# Patient Record
Sex: MALE | Race: WHITE | NOT HISPANIC OR LATINO | Employment: FULL TIME | URBAN - METROPOLITAN AREA
[De-identification: names, ages, dates, MRNs, and addresses within clinical notes are randomized per-mention and may not be internally consistent; named-entity substitution may affect disease eponyms.]

---

## 2022-10-15 ENCOUNTER — HOSPITAL ENCOUNTER (OUTPATIENT)
Facility: HOSPITAL | Age: 57
Discharge: HOME | End: 2022-10-17
Attending: STUDENT IN AN ORGANIZED HEALTH CARE EDUCATION/TRAINING PROGRAM | Admitting: INTERNAL MEDICINE
Payer: COMMERCIAL

## 2022-10-15 DIAGNOSIS — R07.89 OTHER CHEST PAIN: ICD-10-CM

## 2022-10-15 DIAGNOSIS — R07.9 CHEST PAIN: ICD-10-CM

## 2022-10-15 DIAGNOSIS — I25.10 CAD (CORONARY ARTERY DISEASE): ICD-10-CM

## 2022-10-15 DIAGNOSIS — I21.4 NSTEMI (NON-ST ELEVATED MYOCARDIAL INFARCTION): ICD-10-CM

## 2022-10-15 LAB
ALBUMIN SERPL BCP-MCNC: 4.1 G/DL (ref 3.5–5.2)
ALP SERPL-CCNC: 73 U/L (ref 55–135)
ALT SERPL W/O P-5'-P-CCNC: 42 U/L (ref 10–44)
ANION GAP SERPL CALC-SCNC: 14 MMOL/L (ref 8–16)
AST SERPL-CCNC: 42 U/L (ref 10–40)
BASOPHILS # BLD AUTO: 0.05 K/UL (ref 0–0.2)
BASOPHILS NFR BLD: 0.4 % (ref 0–1.9)
BILIRUB SERPL-MCNC: 1 MG/DL (ref 0.1–1)
BNP SERPL-MCNC: 20 PG/ML (ref 0–99)
BUN SERPL-MCNC: 18 MG/DL (ref 6–20)
CALCIUM SERPL-MCNC: 9.3 MG/DL (ref 8.7–10.5)
CHLORIDE SERPL-SCNC: 104 MMOL/L (ref 95–110)
CO2 SERPL-SCNC: 20 MMOL/L (ref 23–29)
CREAT SERPL-MCNC: 1.3 MG/DL (ref 0.5–1.4)
DIFFERENTIAL METHOD: ABNORMAL
EOSINOPHIL # BLD AUTO: 0.2 K/UL (ref 0–0.5)
EOSINOPHIL NFR BLD: 1.5 % (ref 0–8)
ERYTHROCYTE [DISTWIDTH] IN BLOOD BY AUTOMATED COUNT: 12.9 % (ref 11.5–14.5)
EST. GFR  (NO RACE VARIABLE): >60 ML/MIN/1.73 M^2
GLUCOSE SERPL-MCNC: 145 MG/DL (ref 70–110)
HCT VFR BLD AUTO: 49 % (ref 40–54)
HGB BLD-MCNC: 16.8 G/DL (ref 14–18)
IMM GRANULOCYTES # BLD AUTO: 0.05 K/UL (ref 0–0.04)
IMM GRANULOCYTES NFR BLD AUTO: 0.4 % (ref 0–0.5)
INR PPP: 1.1
LYMPHOCYTES # BLD AUTO: 1.9 K/UL (ref 1–4.8)
LYMPHOCYTES NFR BLD: 14.8 % (ref 18–48)
MCH RBC QN AUTO: 30.3 PG (ref 27–31)
MCHC RBC AUTO-ENTMCNC: 34.3 G/DL (ref 32–36)
MCV RBC AUTO: 88 FL (ref 82–98)
MONOCYTES # BLD AUTO: 0.7 K/UL (ref 0.3–1)
MONOCYTES NFR BLD: 5.3 % (ref 4–15)
NEUTROPHILS # BLD AUTO: 10.2 K/UL (ref 1.8–7.7)
NEUTROPHILS NFR BLD: 77.6 % (ref 38–73)
NRBC BLD-RTO: 0 /100 WBC
PLATELET # BLD AUTO: 248 K/UL (ref 150–450)
PMV BLD AUTO: 10.5 FL (ref 9.2–12.9)
POTASSIUM SERPL-SCNC: 4 MMOL/L (ref 3.5–5.1)
PROT SERPL-MCNC: 8.7 G/DL (ref 6–8.4)
PROTHROMBIN TIME: 13.3 SEC (ref 11.4–13.7)
RBC # BLD AUTO: 5.54 M/UL (ref 4.6–6.2)
SODIUM SERPL-SCNC: 138 MMOL/L (ref 136–145)
TROPONIN I SERPL DL<=0.01 NG/ML-MCNC: 0.08 NG/ML
TROPONIN I SERPL DL<=0.01 NG/ML-MCNC: <0.03 NG/ML
WBC # BLD AUTO: 13.07 K/UL (ref 3.9–12.7)

## 2022-10-15 PROCEDURE — 80053 COMPREHEN METABOLIC PANEL: CPT | Performed by: STUDENT IN AN ORGANIZED HEALTH CARE EDUCATION/TRAINING PROGRAM

## 2022-10-15 PROCEDURE — 25000003 PHARM REV CODE 250: Performed by: STUDENT IN AN ORGANIZED HEALTH CARE EDUCATION/TRAINING PROGRAM

## 2022-10-15 PROCEDURE — 93005 ELECTROCARDIOGRAM TRACING: CPT | Performed by: INTERNAL MEDICINE

## 2022-10-15 PROCEDURE — 85610 PROTHROMBIN TIME: CPT | Performed by: STUDENT IN AN ORGANIZED HEALTH CARE EDUCATION/TRAINING PROGRAM

## 2022-10-15 PROCEDURE — 84484 ASSAY OF TROPONIN QUANT: CPT | Mod: 91 | Performed by: STUDENT IN AN ORGANIZED HEALTH CARE EDUCATION/TRAINING PROGRAM

## 2022-10-15 PROCEDURE — 93010 ELECTROCARDIOGRAM REPORT: CPT | Mod: ,,, | Performed by: INTERNAL MEDICINE

## 2022-10-15 PROCEDURE — 99285 EMERGENCY DEPT VISIT HI MDM: CPT | Mod: 25

## 2022-10-15 PROCEDURE — 93010 ELECTROCARDIOGRAM REPORT: CPT | Mod: 76,,, | Performed by: INTERNAL MEDICINE

## 2022-10-15 PROCEDURE — 93010 EKG 12-LEAD: ICD-10-PCS | Mod: ,,, | Performed by: INTERNAL MEDICINE

## 2022-10-15 PROCEDURE — 85025 COMPLETE CBC W/AUTO DIFF WBC: CPT | Performed by: STUDENT IN AN ORGANIZED HEALTH CARE EDUCATION/TRAINING PROGRAM

## 2022-10-15 PROCEDURE — 84484 ASSAY OF TROPONIN QUANT: CPT | Performed by: STUDENT IN AN ORGANIZED HEALTH CARE EDUCATION/TRAINING PROGRAM

## 2022-10-15 PROCEDURE — 83880 ASSAY OF NATRIURETIC PEPTIDE: CPT | Performed by: STUDENT IN AN ORGANIZED HEALTH CARE EDUCATION/TRAINING PROGRAM

## 2022-10-15 RX ORDER — SECUKINUMAB 150 MG/ML
300 INJECTION SUBCUTANEOUS
COMMUNITY
Start: 2022-09-21

## 2022-10-15 RX ORDER — NAPROXEN SODIUM 220 MG/1
324 TABLET, FILM COATED ORAL ONCE
Status: COMPLETED | OUTPATIENT
Start: 2022-10-15 | End: 2022-10-15

## 2022-10-15 RX ADMIN — ASPIRIN 81 MG CHEWABLE TABLET 324 MG: 81 TABLET CHEWABLE at 07:10

## 2022-10-15 NOTE — Clinical Note
The catheter was inserted into the left ventricle. An angiography was performed of the LV. The angiography was performed via power injection. The injected amount was 24 mL contrast at 12 mL/s. The PSI from the power injection was 500.

## 2022-10-15 NOTE — Clinical Note
110 ml of contrast were injected throughout the case. 90 mL of contrast was the total wasted during the case. 200 mL was the total amount used during the case.

## 2022-10-15 NOTE — Clinical Note
A dressing was applied to the incision. Gauze and tegaderm to be applied post closure device and manual pressure

## 2022-10-15 NOTE — Clinical Note
The catheter was removed from the aorta. Vaccine Information Statement(s) was given today. This has been reviewed, questions answered, and verbal consent given by Patient for injection(s) and administration of Hepatitis A.        Patient tolerated without incident. See immunization grid for documentation.

## 2022-10-16 ENCOUNTER — CLINICAL SUPPORT (OUTPATIENT)
Dept: CARDIOLOGY | Facility: HOSPITAL | Age: 57
End: 2022-10-16
Payer: COMMERCIAL

## 2022-10-16 VITALS — HEIGHT: 64 IN | WEIGHT: 215 LBS | BODY MASS INDEX: 36.7 KG/M2

## 2022-10-16 PROBLEM — R07.9 CHEST PAIN: Status: ACTIVE | Noted: 2022-10-16

## 2022-10-16 PROBLEM — E66.01 SEVERE OBESITY (BMI >= 40): Status: ACTIVE | Noted: 2022-10-16

## 2022-10-16 LAB
ALBUMIN SERPL BCP-MCNC: 3.6 G/DL (ref 3.5–5.2)
ALP SERPL-CCNC: 63 U/L (ref 55–135)
ALT SERPL W/O P-5'-P-CCNC: 36 U/L (ref 10–44)
ANION GAP SERPL CALC-SCNC: 9 MMOL/L (ref 8–16)
AORTIC ROOT ANNULUS: 3.03 CM
APTT PPP: 32.1 SEC (ref 23.3–35.1)
APTT PPP: 55.5 SEC (ref 23.3–35.1)
AST SERPL-CCNC: 35 U/L (ref 10–40)
AV INDEX (PROSTH): 1.01
AV MEAN GRADIENT: 4 MMHG
AV VALVE AREA: 3.16 CM2
BASOPHILS # BLD AUTO: 0.02 K/UL (ref 0–0.2)
BASOPHILS NFR BLD: 0.3 % (ref 0–1.9)
BILIRUB SERPL-MCNC: 1.2 MG/DL (ref 0.1–1)
BSA FOR ECHO PROCEDURE: 2.1 M2
BUN SERPL-MCNC: 17 MG/DL (ref 6–20)
CALCIUM SERPL-MCNC: 8.5 MG/DL (ref 8.7–10.5)
CHLORIDE SERPL-SCNC: 103 MMOL/L (ref 95–110)
CO2 SERPL-SCNC: 25 MMOL/L (ref 23–29)
CREAT SERPL-MCNC: 1.1 MG/DL (ref 0.5–1.4)
CV ECHO LV RWT: 0.81 CM
DIFFERENTIAL METHOD: NORMAL
DOP CALC AO VTI: 23.65 CM
DOP CALC LVOT AREA: 3.1 CM2
DOP CALC LVOT DIAMETER: 2 CM
DOP CALC LVOT PEAK VEL: 103.64 M/S
DOP CALC LVOT STROKE VOLUME: 74.7 CM3
DOP CALCLVOT PEAK VEL VTI: 23.79 CM
E WAVE DECELERATION TIME: 229.36 MSEC
E/A RATIO: 1.36
E/E' RATIO: 7.22 M/S
ECHO LV POSTERIOR WALL: 1.42 CM (ref 0.6–1.1)
EJECTION FRACTION: 60 %
EOSINOPHIL # BLD AUTO: 0.2 K/UL (ref 0–0.5)
EOSINOPHIL NFR BLD: 3.6 % (ref 0–8)
ERYTHROCYTE [DISTWIDTH] IN BLOOD BY AUTOMATED COUNT: 12.9 % (ref 11.5–14.5)
EST. GFR  (NO RACE VARIABLE): >60 ML/MIN/1.73 M^2
FRACTIONAL SHORTENING: 31 % (ref 28–44)
GLUCOSE SERPL-MCNC: 130 MG/DL (ref 70–110)
HCT VFR BLD AUTO: 44.4 % (ref 40–54)
HGB BLD-MCNC: 15.4 G/DL (ref 14–18)
IMM GRANULOCYTES # BLD AUTO: 0.03 K/UL (ref 0–0.04)
IMM GRANULOCYTES NFR BLD AUTO: 0.5 % (ref 0–0.5)
INR PPP: 1.1
INTERVENTRICULAR SEPTUM: 1.45 CM (ref 0.6–1.1)
IVRT: 66.3 MSEC
LEFT ATRIUM SIZE: 3.52 CM
LEFT INTERNAL DIMENSION IN SYSTOLE: 2.41 CM (ref 2.1–4)
LEFT VENTRICLE DIASTOLIC VOLUME INDEX: 20.97 ML/M2
LEFT VENTRICLE DIASTOLIC VOLUME: 42.35 ML
LEFT VENTRICLE MASS INDEX: 89 G/M2
LEFT VENTRICLE SYSTOLIC VOLUME INDEX: 6.9 ML/M2
LEFT VENTRICLE SYSTOLIC VOLUME: 13.97 ML
LEFT VENTRICULAR INTERNAL DIMENSION IN DIASTOLE: 3.49 CM (ref 3.5–6)
LEFT VENTRICULAR MASS: 179.27 G
LV LATERAL E/E' RATIO: 5.93 M/S
LV SEPTAL E/E' RATIO: 9.22 M/S
LYMPHOCYTES # BLD AUTO: 1.9 K/UL (ref 1–4.8)
LYMPHOCYTES NFR BLD: 27.9 % (ref 18–48)
MCH RBC QN AUTO: 31 PG (ref 27–31)
MCHC RBC AUTO-ENTMCNC: 34.7 G/DL (ref 32–36)
MCV RBC AUTO: 90 FL (ref 82–98)
MONOCYTES # BLD AUTO: 0.5 K/UL (ref 0.3–1)
MONOCYTES NFR BLD: 8.1 % (ref 4–15)
MV PEAK A VEL: 0.61 M/S
MV PEAK E VEL: 0.83 M/S
NEUTROPHILS # BLD AUTO: 4 K/UL (ref 1.8–7.7)
NEUTROPHILS NFR BLD: 59.6 % (ref 38–73)
NRBC BLD-RTO: 0 /100 WBC
PLATELET # BLD AUTO: 201 K/UL (ref 150–450)
PMV BLD AUTO: 10.7 FL (ref 9.2–12.9)
POTASSIUM SERPL-SCNC: 3.8 MMOL/L (ref 3.5–5.1)
PROT SERPL-MCNC: 7.6 G/DL (ref 6–8.4)
PROTHROMBIN TIME: 13.6 SEC (ref 11.4–13.7)
RA PRESSURE: 3 MMHG
RBC # BLD AUTO: 4.96 M/UL (ref 4.6–6.2)
RIGHT VENTRICULAR END-DIASTOLIC DIMENSION: 3.08 CM
SARS-COV-2 RDRP RESP QL NAA+PROBE: NEGATIVE
SODIUM SERPL-SCNC: 137 MMOL/L (ref 136–145)
TDI LATERAL: 0.14 M/S
TDI SEPTAL: 0.09 M/S
TDI: 0.12 M/S
TRICUSPID ANNULAR PLANE SYSTOLIC EXCURSION: 2.01 CM
TROPONIN I SERPL DL<=0.01 NG/ML-MCNC: 0.24 NG/ML
TROPONIN I SERPL DL<=0.01 NG/ML-MCNC: 0.7 NG/ML
TROPONIN I SERPL DL<=0.01 NG/ML-MCNC: 0.87 NG/ML
WBC # BLD AUTO: 6.63 K/UL (ref 3.9–12.7)

## 2022-10-16 PROCEDURE — 63600175 PHARM REV CODE 636 W HCPCS: Performed by: STUDENT IN AN ORGANIZED HEALTH CARE EDUCATION/TRAINING PROGRAM

## 2022-10-16 PROCEDURE — 96376 TX/PRO/DX INJ SAME DRUG ADON: CPT | Mod: 59

## 2022-10-16 PROCEDURE — 25000003 PHARM REV CODE 250: Performed by: NURSE PRACTITIONER

## 2022-10-16 PROCEDURE — 99220 PR INITIAL OBSERVATION CARE,LEVL III: ICD-10-PCS | Mod: 25,,, | Performed by: INTERNAL MEDICINE

## 2022-10-16 PROCEDURE — 99220 PR INITIAL OBSERVATION CARE,LEVL III: CPT | Mod: 25,,, | Performed by: INTERNAL MEDICINE

## 2022-10-16 PROCEDURE — G0378 HOSPITAL OBSERVATION PER HR: HCPCS

## 2022-10-16 PROCEDURE — 93306 TTE W/DOPPLER COMPLETE: CPT

## 2022-10-16 PROCEDURE — U0002 COVID-19 LAB TEST NON-CDC: HCPCS | Performed by: NURSE PRACTITIONER

## 2022-10-16 PROCEDURE — 83036 HEMOGLOBIN GLYCOSYLATED A1C: CPT | Performed by: NURSE PRACTITIONER

## 2022-10-16 PROCEDURE — 25000003 PHARM REV CODE 250: Performed by: INTERNAL MEDICINE

## 2022-10-16 PROCEDURE — 84484 ASSAY OF TROPONIN QUANT: CPT | Mod: 91 | Performed by: STUDENT IN AN ORGANIZED HEALTH CARE EDUCATION/TRAINING PROGRAM

## 2022-10-16 PROCEDURE — 93306 ECHO (CUPID ONLY): ICD-10-PCS | Mod: 26,,, | Performed by: INTERNAL MEDICINE

## 2022-10-16 PROCEDURE — 85730 THROMBOPLASTIN TIME PARTIAL: CPT | Performed by: STUDENT IN AN ORGANIZED HEALTH CARE EDUCATION/TRAINING PROGRAM

## 2022-10-16 PROCEDURE — 93005 ELECTROCARDIOGRAM TRACING: CPT | Performed by: INTERNAL MEDICINE

## 2022-10-16 PROCEDURE — 85610 PROTHROMBIN TIME: CPT | Performed by: STUDENT IN AN ORGANIZED HEALTH CARE EDUCATION/TRAINING PROGRAM

## 2022-10-16 PROCEDURE — 93010 EKG 12-LEAD: ICD-10-PCS | Mod: ,,, | Performed by: INTERNAL MEDICINE

## 2022-10-16 PROCEDURE — 84484 ASSAY OF TROPONIN QUANT: CPT | Performed by: NURSE PRACTITIONER

## 2022-10-16 PROCEDURE — 36415 COLL VENOUS BLD VENIPUNCTURE: CPT | Performed by: NURSE PRACTITIONER

## 2022-10-16 PROCEDURE — 85025 COMPLETE CBC W/AUTO DIFF WBC: CPT | Performed by: INTERNAL MEDICINE

## 2022-10-16 PROCEDURE — 93010 ELECTROCARDIOGRAM REPORT: CPT | Mod: ,,, | Performed by: INTERNAL MEDICINE

## 2022-10-16 PROCEDURE — 80053 COMPREHEN METABOLIC PANEL: CPT | Performed by: INTERNAL MEDICINE

## 2022-10-16 PROCEDURE — 96374 THER/PROPH/DIAG INJ IV PUSH: CPT | Mod: 59

## 2022-10-16 PROCEDURE — 93306 TTE W/DOPPLER COMPLETE: CPT | Mod: 26,,, | Performed by: INTERNAL MEDICINE

## 2022-10-16 PROCEDURE — 36415 COLL VENOUS BLD VENIPUNCTURE: CPT | Performed by: STUDENT IN AN ORGANIZED HEALTH CARE EDUCATION/TRAINING PROGRAM

## 2022-10-16 RX ORDER — SODIUM CHLORIDE 0.9 % (FLUSH) 0.9 %
10 SYRINGE (ML) INJECTION
Status: DISCONTINUED | OUTPATIENT
Start: 2022-10-16 | End: 2022-10-17 | Stop reason: HOSPADM

## 2022-10-16 RX ORDER — HEPARIN SODIUM,PORCINE/D5W 25000/250
0-40 INTRAVENOUS SOLUTION INTRAVENOUS CONTINUOUS
Status: DISCONTINUED | OUTPATIENT
Start: 2022-10-16 | End: 2022-10-17 | Stop reason: HOSPADM

## 2022-10-16 RX ORDER — ASPIRIN 325 MG
325 TABLET, DELAYED RELEASE (ENTERIC COATED) ORAL DAILY
Status: DISCONTINUED | OUTPATIENT
Start: 2022-10-16 | End: 2022-10-16

## 2022-10-16 RX ORDER — METOPROLOL TARTRATE 25 MG/1
25 TABLET, FILM COATED ORAL 2 TIMES DAILY
Status: DISCONTINUED | OUTPATIENT
Start: 2022-10-16 | End: 2022-10-17 | Stop reason: HOSPADM

## 2022-10-16 RX ORDER — NITROGLYCERIN 0.4 MG/1
0.4 TABLET SUBLINGUAL EVERY 5 MIN PRN
Status: DISCONTINUED | OUTPATIENT
Start: 2022-10-16 | End: 2022-10-17 | Stop reason: HOSPADM

## 2022-10-16 RX ORDER — IBUPROFEN 400 MG/1
400 TABLET ORAL EVERY 6 HOURS PRN
Status: DISCONTINUED | OUTPATIENT
Start: 2022-10-16 | End: 2022-10-17 | Stop reason: HOSPADM

## 2022-10-16 RX ORDER — ASPIRIN 325 MG
325 TABLET, DELAYED RELEASE (ENTERIC COATED) ORAL DAILY
Status: DISCONTINUED | OUTPATIENT
Start: 2022-10-17 | End: 2022-10-17

## 2022-10-16 RX ORDER — FAMOTIDINE 20 MG/1
20 TABLET, FILM COATED ORAL 2 TIMES DAILY
Status: DISCONTINUED | OUTPATIENT
Start: 2022-10-16 | End: 2022-10-17 | Stop reason: HOSPADM

## 2022-10-16 RX ORDER — ONDANSETRON 2 MG/ML
4 INJECTION INTRAMUSCULAR; INTRAVENOUS EVERY 8 HOURS PRN
Status: DISCONTINUED | OUTPATIENT
Start: 2022-10-16 | End: 2022-10-17 | Stop reason: HOSPADM

## 2022-10-16 RX ORDER — SODIUM CHLORIDE 9 MG/ML
INJECTION, SOLUTION INTRAVENOUS ONCE
Status: COMPLETED | OUTPATIENT
Start: 2022-10-17 | End: 2022-10-16

## 2022-10-16 RX ORDER — TALC
6 POWDER (GRAM) TOPICAL NIGHTLY PRN
Status: DISCONTINUED | OUTPATIENT
Start: 2022-10-16 | End: 2022-10-17 | Stop reason: HOSPADM

## 2022-10-16 RX ORDER — ATORVASTATIN CALCIUM 40 MG/1
40 TABLET, FILM COATED ORAL DAILY
Status: DISCONTINUED | OUTPATIENT
Start: 2022-10-16 | End: 2022-10-17 | Stop reason: HOSPADM

## 2022-10-16 RX ADMIN — FAMOTIDINE 20 MG: 20 TABLET ORAL at 08:10

## 2022-10-16 RX ADMIN — METOPROLOL TARTRATE 25 MG: 25 TABLET, FILM COATED ORAL at 08:10

## 2022-10-16 RX ADMIN — HEPARIN SODIUM AND DEXTROSE 12 UNITS/KG/HR: 10000; 5 INJECTION INTRAVENOUS at 11:10

## 2022-10-16 RX ADMIN — HEPARIN SODIUM AND DEXTROSE 15 UNITS/KG/HR: 10000; 5 INJECTION INTRAVENOUS at 11:10

## 2022-10-16 RX ADMIN — METOPROLOL TARTRATE 25 MG: 25 TABLET, FILM COATED ORAL at 11:10

## 2022-10-16 RX ADMIN — SODIUM CHLORIDE: 0.9 INJECTION, SOLUTION INTRAVENOUS at 11:10

## 2022-10-16 RX ADMIN — FAMOTIDINE 20 MG: 20 TABLET ORAL at 11:10

## 2022-10-16 RX ADMIN — ATORVASTATIN CALCIUM 40 MG: 40 TABLET, FILM COATED ORAL at 11:10

## 2022-10-16 NOTE — NURSING
Troponin elevated, no stress test at this time per Dr Sotelo, continue to trend troponins. Echocardiogram in progress. RUPERTO Glover notified.

## 2022-10-16 NOTE — ED NOTES
""My heart felt like it was beating out of my chest, felt like difficulty breathing, and my fingers tingled" -- patient's description of the event that led him to ER.     HTN upon triage 182/98, HR 98, no dyspnea, no diaphoresis, no c/o N/V, denies tingling in extremities     "

## 2022-10-16 NOTE — ED PROVIDER NOTES
Encounter Date: 10/15/2022       History     Chief Complaint   Patient presents with    Chest Pain     Pt presents to er complaining of mid sternal chest pain causing tingling in bilateral arms and hands. Pt states the pain started while driving and he attempted to drink some water and states that it feels like he has a lump in his chest.      HPI  57-year-old male with past medical history of obesity and psoriasis presenting to the emergency department for acute onset chest pain.  Patient reports the pain began 30 minutes after physical exertion.  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  30 minutes after completion, he noted substernal chest tightness with associated shortness of breath.  After resting and drinking some water, the pain began to subside and ultimately fully resolved approximately 30 minutes prior to arrival at the emergency department.  Denies any associated nausea, vomiting, diaphoresis.  He denies prior episodes of chest pain, cardiac history, relevant family history of early cardiac death, smoking history.    Review of patient's allergies indicates:   Allergen Reactions    Codeine Hives    Penicillins Hives     Past Medical History:   Diagnosis Date    Acid reflux     Obesity     Psoriasis     severe    Psoriatic arthritis     Urolithiasis      Past Surgical History:   Procedure Laterality Date    CIRCUMCISION      EXTRACORPOREAL SHOCK WAVE LITHOTRIPSY  2006    tristan redmond md    holmium lithotripsy L ureteral stone  8/2014    [facility]    ureteroscopic removal of stones  2006    tristan redmond md     No family history on file.  Social History     Tobacco Use    Smoking status: Never    Smokeless tobacco: Never   Substance Use Topics    Alcohol use: No    Drug use: No     Review of Systems   Constitutional:  Negative for fever.   HENT:  Negative for congestion.    Respiratory:  Positive for shortness of breath.     Cardiovascular:  Positive for chest pain.   Gastrointestinal:  Negative for abdominal pain.   Genitourinary:  Negative for dysuria.   Musculoskeletal:  Negative for arthralgias.   Neurological:  Negative for dizziness and syncope.   Psychiatric/Behavioral:  Negative for agitation.      Physical Exam     Initial Vitals [10/15/22 1921]   BP Pulse Resp Temp SpO2   (!) 182/98 102 18 98.5 °F (36.9 °C) 97 %      MAP       --         Physical Exam    Nursing note and vitals reviewed.  Constitutional:   57-year-old male sitting comfortably in bed, awake, alert and responding to questions appropriately.   HENT:   Head: Normocephalic and atraumatic.   Mouth/Throat: Oropharynx is clear and moist.   Eyes: Conjunctivae and EOM are normal. Pupils are equal, round, and reactive to light.   Neck:   Normal range of motion.  Cardiovascular:  Normal rate and regular rhythm.           no lower extremity edema   Pulmonary/Chest: Effort normal and breath sounds normal. He has no wheezes. He has no rhonchi. He has no rales.   Abdominal: Abdomen is soft. There is no abdominal tenderness.   Musculoskeletal:      Cervical back: Normal range of motion.     Neurological: He is alert and oriented to person, place, and time.   Moving all extremities, no focal deficits   Skin: Skin is warm and intact.   Psychiatric: He has a normal mood and affect. His speech is normal and behavior is normal.       ED Course   Procedures  Labs Reviewed   CBC W/ AUTO DIFFERENTIAL - Abnormal; Notable for the following components:       Result Value    WBC 13.07 (*)     Gran # (ANC) 10.2 (*)     Immature Grans (Abs) 0.05 (*)     Gran % 77.6 (*)     Lymph % 14.8 (*)     All other components within normal limits   COMPREHENSIVE METABOLIC PANEL - Abnormal; Notable for the following components:    CO2 20 (*)     Glucose 145 (*)     Total Protein 8.7 (*)     AST 42 (*)     All other components within normal limits   TROPONIN I - Abnormal; Notable for the following  components:    Troponin I 0.082 (*)     All other components within normal limits   B-TYPE NATRIURETIC PEPTIDE   TROPONIN I   PROTIME-INR          Imaging Results              X-Ray Chest AP Portable (Final result)  Result time 10/15/22 19:53:03      Final result by Mitchell Lucero MD (10/15/22 19:53:03)                   Narrative:    Reason: chest pain    FINDINGS:    Portable chest with comparison chest x-ray June 14, 2018 show normal cardiomediastinal silhouette.  Lungs are clear. Pulmonary vasculature is normal. No acute osseous abnormality.    IMPRESSION:    No acute cardiopulmonary abnormality.    Electronically signed by:  Mitchell Lucero DO  10/15/2022 7:53 PM CDT Workstation: [id number]                                     Medications   aspirin chewable tablet 324 mg (324 mg Oral Given 10/15/22 1942)     Medical Decision Making:   Initial Assessment:   57-year-old male with past medical history of obesity and psoriasis presenting to the emergency department for acute onset chest pain.  Patient reports the pain began 30 minutes after physical exertion with associated shortness of breath that has since resolved 30 minutes prior to arrival.  Denies any associated nausea, vomiting, diaphoresis.  He denies prior episodes of chest pain, cardiac history, relevant family history of early cardiac death, smoking history.  Blood pressure 174/78, vitals otherwise within normal limits.  Patient denies history of hypertension.  Physical exam is unremarkable.  See above.    Differential Diagnosis:   Angina, ACS, GERD, pneumonia, pneumothorax, MSK pain   ED Management:  Cardiac workup ordered and patient given aspirin.  Initial EKG with normal sinus rhythm, no ischemic changes.  Chest x-ray with no acute cardiopulmonary pathology.  Initial troponin undetectable.  CBC and CMP within normal limits.  Heart score 3.  Patient remains asymptomatic throughout his ED course but repeat troponin elevated at 0.082.  Repeat EKG  ordered at this time - remains unchanged, no ischemic changes. Patient consulted to hospital medicine for admission in the setting of ACS rule out and up trending troponin.  Findings and condition communicated to the patient.  He expressed understanding and is amenable to admission.    Ruiz Boss MD   [employer] EM PGY-3  10/15/2022  8:39 PM             ED Course as of 10/15/22 2325   Sat Oct 15, 2022   2251 EKG with regular rate, regular rhythm, normal axis, no acute ST elevations or depressions, normal LA, QRS and QT interval. Interpreted by me.   [BS]   2251 57-year-old male with obesity, hypertension presents with an episode of exertional substernal chest pain that improved prior to arrival.  Initial vitals notable for hypertension.  Initial EKG without acute ischemic changes, initial troponin within normal limits.  3 hour troponin up trending.  325 mg aspirin given. Will admit to hospital medicine for high-risk chest pain and uptrending troponins. [BS]   2252 Troponin I(!): 0.082 [ES]      ED Course User Index  [BS] Keith Miguel MD  [ES] Ruiz Boss MD                 Clinical Impression:   Final diagnoses:  [R07.9] Chest pain        ED Disposition Condition    Admit Stable                Ruiz Boss MD  Resident  10/15/22 2325

## 2022-10-16 NOTE — NURSING
Trop. Trending down 0.700 last. Scheduled for Angio in AM per Dr. Sotelo. NPO lifted and food tray ordered. Will be NPO after Midnight.

## 2022-10-16 NOTE — PROGRESS NOTES
[facility] Medicine  Progress Note    Patient name: Guanakito Orosco  MRN: [id number]  Admit Date: 10/15/2022   LOS: 0 days     SUBJECTIVE:     Principal problem: Chest pain    Interval History:  chest pain free this morning, trop continue to increase, check ekg.  Consult cardiology.  No stress test while trop rising.  Echo complete and normal.  Right leg is a little larger than left will get US to r/o DVT.  HD stable    Scheduled Meds:   [START ON 10/17/2022] aspirin  325 mg Oral Daily    atorvastatin  40 mg Oral Daily    famotidine  20 mg Oral BID    heparin (PORCINE)  53.6 Units/kg (Adjusted) Intravenous Once    metoprolol tartrate  25 mg Oral BID     Continuous Infusions:   heparin (porcine) in D5W       PRN Meds:heparin (PORCINE), heparin (PORCINE), ibuprofen, melatonin, nitroglycerin, ondansetron, sodium chloride 0.9%    Review of patient's allergies indicates:   Allergen Reactions    Codeine Hives    Penicillins Hives       Review of Systems: As per interval history    OBJECTIVE:     Vital Signs (Most Recent)  Temp: 97.9 °F (36.6 °C) (10/16/22 0715)  Pulse: 71 (10/16/22 0715)  Resp: 18 (10/16/22 0715)  BP: (!) 119/59 (10/16/22 0715)  SpO2: 97 % (10/16/22 0715)    Vital Signs Range (Last 24H):  Temp:  [97.8 °F (36.6 °C)-98.5 °F (36.9 °C)]   Pulse:  []   Resp:  [16-18]   BP: (113-182)/(59-98)   SpO2:  [96 %-98 %]     I & O (Last 24H):No intake or output data in the 24 hours ending 10/16/22 0943    Physical Exam:  General: Patient resting comfortably in no acute distress.   Eyes: No conjunctival injection. No scleral icterus.  ENT: Hearing grossly intact. No discharge from ears. No nasal discharge.   Neck: Supple, trachea midline. No JVD  CVS: RRR. Right calf slightly larger than left, no significant edema or pain.    Lungs:  No tachypnea or accessory muscle use.  Clear to auscultation bilaterally  Abdomen:  Soft, nontender and nondistended.  No organomegaly  Neuro: AOx3. Moves all  extremities. Follows commands. Responds appropriately   Skin:  psoriatic rash to bilateral LE from knee to ankles      Laboratory:  I have reviewed all pertinent lab results within the past 24 hours.  CBC:   Recent Labs   Lab 10/16/22  0735   WBC 6.63   RBC 4.96   HGB 15.4   HCT 44.4      MCV 90   MCH 31.0   MCHC 34.7     CMP:   Recent Labs   Lab 10/16/22  0735   *   CALCIUM 8.5*   ALBUMIN 3.6   PROT 7.6      K 3.8   CO2 25      BUN 17   CREATININE 1.1   ALKPHOS 63   ALT 36   AST 35   BILITOT 1.2*     Cardiac markers:   Recent Labs   Lab 10/16/22  0735   TROPONINI 0.870*       Diagnostic Results:  Labs: Reviewed  ECG: Reviewed  X-Ray: Reviewed  Echo: Reviewed      ASSESSMENT/PLAN:     Guanakito Orosco is a 57 y.o. old  male who  has a past medical history of Acid reflux, Obesity, Psoriasis, Psoriatic arthritis, and Urolithiasis.. The patient presented to [facility] on 10/15/2022 with a primary complaint of Chest Pain.  Pt presents to er complaining of mid sternal chest pain causing tingling in bilateral arms and hands. Pt states the pain started while driving after he drank some ice cold water and states that it felt like he had a lump in his chest. No ekg changes, echo wnl, trop continues to increase.     Active Hospital Problems    Diagnosis  POA    *Chest pain [R07.9]  Yes    Severe obesity (BMI >= 40) [E66.01]  Yes      Resolved Hospital Problems   No resolved problems to display.         Plan:     Chest Pain; NSTEMI  -continue to trend cardiac enzymes and troponin  -0.08>0.25>0.87  -2D echo complete  -no stress while trop increasing  -aspirin, statin, beta-blocker, p.r.n. nitrates  -start heparin infusion and consult cardiology  -NPO   -pain management with morphine  -repeat 12 lead EKG   -if next trop around 12 pm continues to increase plan for possible cath      Hypertension on arrival  -no dx of HTN  -trend and may need to start antihypertensive before  discharge    2. Obesity: BMI: 36.90  - wt modification plan with PCP at discharge     3. Psoriatic arthritis  -on Cosentyx      VTE Risk Mitigation (From admission, onward)           Ordered     heparin 25,000 units in dextrose 5% (100 units/ml) IV bolus from bag - ADDITIONAL PRN BOLUS - 60 units/kg (max bolus 4000 units)  As needed (PRN)        Question:  Heparin Infusion Adjustment (DO NOT MODIFY ANSWER)  Answer:  [email or web address]\epic\Images\Pharmacy\HeparinInfusions\heparin LOW INTENSITY nomogram for [facility] [id number]    10/16/22 0942     heparin 25,000 units in dextrose 5% (100 units/ml) IV bolus from bag - ADDITIONAL PRN BOLUS - 30 units/kg (max bolus 4000 units)  As needed (PRN)        Question:  Heparin Infusion Adjustment (DO NOT MODIFY ANSWER)  Answer:  [email or web address]\epic\Images\Pharmacy\HeparinInfusions\heparin LOW INTENSITY nomogram for [facility] [id number].pdf    10/16/22 0942     heparin 25,000 units in dextrose 5% (100 units/ml) IV bolus from bag INITIAL BOLUS (max bolus 4000 units)  Once        Question:  Heparin Infusion Adjustment (DO NOT MODIFY ANSWER)  Answer:  \\[email or web address]\epic\Images\Pharmacy\HeparinInfusions\heparin LOW INTENSITY nomogram for [facility] [id number].pdf    10/16/22 0942     heparin 25,000 units in dextrose 5% 250 mL (100 units/mL) infusion LOW INTENSITY nomogram - [facility]  Continuous        Question Answer Comment   Heparin Infusion Adjustment (DO NOT MODIFY ANSWER) \\[email or web address]\epic\Images\Pharmacy\HeparinInfusions\heparin LOW INTENSITY nomogram for [facility] [id number]    Begin at (in units/kg/hr) 12        10/16/22 0942     IP VTE LOW RISK PATIENT  Once         10/16/22 0025     Place sequential compression device  Until discontinued         10/16/22 0025                        Department Hospital Medicine  [facility]  Bernardo Golden MD  Date of service: 10/16/2022

## 2022-10-16 NOTE — H&P
[facility] Medicine History & Physical Examination   Patient Name: Guanakito Orosco  MRN: [id number]  Patient Class: Emergency   Admission Date: 10/15/2022  7:14 PM  Length of Stay: 0  Attending Physician:   Primary Care Provider: Chau Rudolph DO  Face-to-Face encounter date: 10/15/2022  Code Status:Full Code  MPOA:  Chief Complaint: Chest Pain (Pt presents to er complaining of mid sternal chest pain causing tingling in bilateral arms and hands. Pt states the pain started while driving and he attempted to drink some water and states that it feels like he has a lump in his chest. )        Patient information was obtained from patient, past medical records and ER records.   HISTORY OF PRESENT ILLNESS:   Guanakito Orosco is a 57 y.o. old  male who  has a past medical history of Acid reflux, Obesity, Psoriasis, Psoriatic arthritis, and Urolithiasis.. The patient presented to [facility] on 10/15/2022 with a primary complaint of Chest Pain (Pt presents to er complaining of mid sternal chest pain causing tingling in bilateral arms and hands. Pt states the pain started while driving and he attempted to drink some water and states that it feels like he has a lump in his chest. )  .     57 year old  male presents emergency room with chest pain.    The patient states while he was outdoors moving products he began to have an episode anterior chest wall pain with radiation to his bilateral anterior breast region.  There was some associated shortness of breath and the patient was diaphoretic.  He denied nausea vomiting lightheadedness dizziness or headache.  He states the pain did rate for radiate from the center of his chest to his bilateral axilla areas any also had numbness and tingling to his bilateral fingers.  The numbness and tingling lasted few minutes to resolve but the chest pain persist.  With the intensity waxing and waning he came to emergency room for further  evaluation.    In the emergency room his 1st set of cardiac enzymes within normal limits and 12 EKG showed no acute ischemic changes.  However his 2nd set of cardiac enzymes had increased.    The patient was given an ASA in the ED.   REVIEW OF SYSTEMS:   10 Point Review of System was performed and was found to be negative except for that mentioned already in the HPI and   Review of Systems (Negative unless checked off)  Review of Systems   Constitutional:  Positive for diaphoresis.   HENT: Negative.     Eyes: Negative.    Respiratory:  Positive for shortness of breath.    Cardiovascular:  Positive for chest pain.   Gastrointestinal: Negative.    Genitourinary: Negative.    Musculoskeletal: Negative.    Skin:  Positive for rash.        Psoriasis   Neurological:  Positive for tingling.   Endo/Heme/Allergies: Negative.    Psychiatric/Behavioral: Negative.           PAST MEDICAL HISTORY:     Past Medical History:   Diagnosis Date    Acid reflux     Obesity     Psoriasis     severe    Psoriatic arthritis     Urolithiasis        PAST SURGICAL HISTORY:     Past Surgical History:   Procedure Laterality Date    CIRCUMCISION      EXTRACORPOREAL SHOCK WAVE LITHOTRIPSY  2006    tristan redmond md    holmium lithotripsy L ureteral stone  8/2014    [facility]    ureteroscopic removal of stones  2006    tristan redmond md       ALLERGIES:   Codeine and Penicillins    FAMILY HISTORY:   No family history on file.    SOCIAL HISTORY:     Social History     Tobacco Use    Smoking status: Never    Smokeless tobacco: Never   Substance Use Topics    Alcohol use: No        Social History     Substance and Sexual Activity   Sexual Activity Yes    Partners: Female        HOME MEDICATIONS:     Prior to Admission medications    Medication Sig Start Date End Date Taking? Authorizing Provider   COSENTYX PEN, 2 PENS, 150 mg/mL PnIj Inject 300 mg into the skin every 28 days. 9/21/22   Historical Provider   entanercept (ENBREL) 50  "mg/mL injection Inject 50 mg into the skin once a week.    Historical Provider   naproxen (NAPROSYN) 500 MG tablet Take 500 mg by mouth continuous prn.    Historical Provider   omeprazole (PRILOSEC OTC) 20 MG tablet Take 20 mg by mouth once daily.    Historical Provider         PHYSICAL EXAM:   /88   Pulse 81   Temp 98.5 °F (36.9 °C) (Oral)   Resp 18   Ht 5' 4" (1.626 m)   Wt 97.5 kg (215 lb)   SpO2 96%   BMI 36.90 kg/m²   Vitals Reviewed  General appearance: Well-developed, well-nourished male in no apparent distress.  Skin:  Plaque type lesions on his arms and legs on the erythemic base consistent with psoriasis   Neuro: Motor and sensory exams grossly intact. Good tone. Power in all 4 extremities 5/5.   HENT: Atraumatic head. Moist mucous membranes of oral cavity.  Eyes: Normal extraocular movements.   Neck: Supple. No evidence of lymphadenopathy. No thyroidomegaly.  Lungs: Clear to auscultation bilaterally. No wheezing present.   Heart: Regular rate and rhythm. S1 and S2 present with no murmurs/gallop/rub. No pedal edema. No JVD present.   Abdomen:  Obese Soft, non-distended, non-tender. No rebound tenderness/guarding. No masses or organomegaly. Bowel sounds are normal. Bladder is not palpable.   Extremities: No cyanosis, clubbing, or edema.  Psych/mental status: Alert and oriented. Cooperative. Responds appropriately to questions.   EMERGENCY DEPARTMENT LABS AND IMAGING:   Following labs were Reviewed   Recent Labs   Lab 10/15/22  1920   WBC 13.07*   HGB 16.8   HCT 49.0      CALCIUM 9.3   ALBUMIN 4.1   PROT 8.7*      K 4.0   CO2 20*      BUN 18   CREATININE 1.3   ALKPHOS 73   ALT 42   AST 42*   BILITOT 1.0         BMP:   Recent Labs   Lab 10/15/22  1920   *      K 4.0      CO2 20*   BUN 18   CREATININE 1.3   CALCIUM 9.3   , CMP   Recent Labs   Lab 10/15/22  1920      K 4.0      CO2 20*   *   BUN 18   CREATININE 1.3   CALCIUM 9.3   PROT 8.7* "   ALBUMIN 4.1   BILITOT 1.0   ALKPHOS 73   AST 42*   ALT 42   ANIONGAP 14   , CBC   Recent Labs   Lab 10/15/22  1920   WBC 13.07*   HGB 16.8   HCT 49.0      , INR   Lab Results   Component Value Date    INR 1.1 10/15/2022   , Lipid Panel No results found for: CHOL, HDL, LDLCALC, TRIG, CHOLHDL, Troponin   Recent Labs   Lab 10/15/22  1920 10/15/22  2215   TROPONINI <0.030 0.082*   , A1C: No results for input(s): HGBA1C in the last 4320 hours., and All labs within the past 24 hours have been reviewed  Microbiology Results (last 7 days)       ** No results found for the last 168 hours. **          X-Ray Chest AP Portable   Final Result        X-Ray Chest AP Portable    Result Date: 10/15/2022  Reason: chest pain FINDINGS: Portable chest with comparison chest x-ray June 14, 2018 show normal cardiomediastinal silhouette. Lungs are clear. Pulmonary vasculature is normal. No acute osseous abnormality. IMPRESSION: No acute cardiopulmonary abnormality. Electronically signed by:  Mitchell Lucero DO  10/15/2022 7:53 PM CDT Workstation: [id number]      I personally reviewed and agree with the radiologist's findings    12 lead EKG reveals a normal sinus rhythm with a normal axis this good R-wave progression this no significant ST or T-wave abnormalities rate 86  milliseconds  ASSESSMENT & PLAN:   Guanakito Orosco is a 57 y.o. male admitted for    Chest Pain-troponin trended upward  - -trend cardiac enzymes and troponin  -2D echo complete  -Mabel stress test  -aspirin, statin, beta-blocker, p.r.n. nitrates  -NPO after midnight  -pain management with morphine  -repeat 12 lead EKG with chest pain       2. Obesity: BMI: 36.90  - wt modification plan with PCP with discharge    3. Psoriatic arthritis  -on Cosentyx        DVT Prophylaxis: will be placed on SCDs for DVT prophylaxis and will be advised to be as mobile as possible and sit in a chair as tolerated.    ________________________________________________________________    Face-to-Face encounter date: 10/15/2022  Encounter included review of the medical records, interviewing and examining the patient face-to-face, discussion with family and other health care providers including emergency medicine physician, admission orders, interpreting lab/test results and formulating a plan of care.   Medical Decision Making during this encounter was  [_] Low Complexity  [_] Moderate Complexity  [x] High Complexity  _________________________________________________________________________________    INPATIENT LIST OF MEDICATIONS   No current facility-administered medications for this encounter.    Current Outpatient Medications:     COSENTYX PEN, 2 PENS, 150 mg/mL PnIj, Inject 300 mg into the skin every 28 days., Disp: , Rfl:     entanercept (ENBREL) 50 mg/mL injection, Inject 50 mg into the skin once a week., Disp: , Rfl:     naproxen (NAPROSYN) 500 MG tablet, Take 500 mg by mouth continuous prn., Disp: , Rfl:     omeprazole (PRILOSEC OTC) 20 MG tablet, Take 20 mg by mouth once daily., Disp: , Rfl:       Scheduled Meds:  Continuous Infusions:  PRN Meds:.      Corina Esposito  [facility] Hospitalist NP  10/15/2022

## 2022-10-17 VITALS
SYSTOLIC BLOOD PRESSURE: 114 MMHG | DIASTOLIC BLOOD PRESSURE: 60 MMHG | RESPIRATION RATE: 19 BRPM | BODY MASS INDEX: 36.74 KG/M2 | HEIGHT: 64 IN | WEIGHT: 215.19 LBS | HEART RATE: 63 BPM | TEMPERATURE: 99 F | OXYGEN SATURATION: 93 %

## 2022-10-17 PROBLEM — I21.4 NSTEMI (NON-ST ELEVATED MYOCARDIAL INFARCTION): Status: ACTIVE | Noted: 2022-10-17

## 2022-10-17 LAB
APTT PPP: 69.8 SEC (ref 23.3–35.1)
APTT PPP: 88.3 SEC (ref 23.3–35.1)
BASOPHILS # BLD AUTO: 0.04 K/UL (ref 0–0.2)
BASOPHILS NFR BLD: 0.5 % (ref 0–1.9)
CATH EF QUANTITATIVE: 65 %
DIFFERENTIAL METHOD: NORMAL
EOSINOPHIL # BLD AUTO: 0.3 K/UL (ref 0–0.5)
EOSINOPHIL NFR BLD: 3.2 % (ref 0–8)
ERYTHROCYTE [DISTWIDTH] IN BLOOD BY AUTOMATED COUNT: 12.9 % (ref 11.5–14.5)
ESTIMATED AVG GLUCOSE: 140 MG/DL (ref 68–131)
HBA1C MFR BLD: 6.5 % (ref 4.5–6.2)
HCT VFR BLD AUTO: 42.8 % (ref 40–54)
HGB BLD-MCNC: 14.6 G/DL (ref 14–18)
IMM GRANULOCYTES # BLD AUTO: 0.03 K/UL (ref 0–0.04)
IMM GRANULOCYTES NFR BLD AUTO: 0.4 % (ref 0–0.5)
LYMPHOCYTES # BLD AUTO: 2.8 K/UL (ref 1–4.8)
LYMPHOCYTES NFR BLD: 36.3 % (ref 18–48)
MCH RBC QN AUTO: 30.4 PG (ref 27–31)
MCHC RBC AUTO-ENTMCNC: 34.1 G/DL (ref 32–36)
MCV RBC AUTO: 89 FL (ref 82–98)
MONOCYTES # BLD AUTO: 0.7 K/UL (ref 0.3–1)
MONOCYTES NFR BLD: 8.5 % (ref 4–15)
NEUTROPHILS # BLD AUTO: 4 K/UL (ref 1.8–7.7)
NEUTROPHILS NFR BLD: 51.1 % (ref 38–73)
NRBC BLD-RTO: 0 /100 WBC
PLATELET # BLD AUTO: 200 K/UL (ref 150–450)
PMV BLD AUTO: 10.6 FL (ref 9.2–12.9)
RBC # BLD AUTO: 4.8 M/UL (ref 4.6–6.2)
WBC # BLD AUTO: 7.74 K/UL (ref 3.9–12.7)

## 2022-10-17 PROCEDURE — 99152 MOD SED SAME PHYS/QHP 5/>YRS: CPT | Mod: ,,, | Performed by: GENERAL PRACTICE

## 2022-10-17 PROCEDURE — 99153 MOD SED SAME PHYS/QHP EA: CPT | Performed by: GENERAL PRACTICE

## 2022-10-17 PROCEDURE — 93458 L HRT ARTERY/VENTRICLE ANGIO: CPT | Performed by: GENERAL PRACTICE

## 2022-10-17 PROCEDURE — 25000003 PHARM REV CODE 250: Performed by: GENERAL PRACTICE

## 2022-10-17 PROCEDURE — 25500020 PHARM REV CODE 255: Performed by: GENERAL PRACTICE

## 2022-10-17 PROCEDURE — 99226 PR SUBSEQUENT OBSERVATION CARE,LEVEL III: CPT | Mod: 25,,, | Performed by: GENERAL PRACTICE

## 2022-10-17 PROCEDURE — 25000003 PHARM REV CODE 250: Performed by: NURSE PRACTITIONER

## 2022-10-17 PROCEDURE — C1760 CLOSURE DEV, VASC: HCPCS | Performed by: GENERAL PRACTICE

## 2022-10-17 PROCEDURE — 36415 COLL VENOUS BLD VENIPUNCTURE: CPT | Performed by: STUDENT IN AN ORGANIZED HEALTH CARE EDUCATION/TRAINING PROGRAM

## 2022-10-17 PROCEDURE — 63600175 PHARM REV CODE 636 W HCPCS: Performed by: GENERAL PRACTICE

## 2022-10-17 PROCEDURE — G0378 HOSPITAL OBSERVATION PER HR: HCPCS

## 2022-10-17 PROCEDURE — 99152 MOD SED SAME PHYS/QHP 5/>YRS: CPT | Performed by: GENERAL PRACTICE

## 2022-10-17 PROCEDURE — 99152 PR MOD CONSCIOUS SEDATION, SAME PHYS, 5+ YRS, FIRST 15 MIN: ICD-10-PCS | Mod: ,,, | Performed by: GENERAL PRACTICE

## 2022-10-17 PROCEDURE — C1769 GUIDE WIRE: HCPCS | Performed by: GENERAL PRACTICE

## 2022-10-17 PROCEDURE — 85730 THROMBOPLASTIN TIME PARTIAL: CPT | Performed by: STUDENT IN AN ORGANIZED HEALTH CARE EDUCATION/TRAINING PROGRAM

## 2022-10-17 PROCEDURE — 93458 L HRT ARTERY/VENTRICLE ANGIO: CPT | Mod: 26,,, | Performed by: GENERAL PRACTICE

## 2022-10-17 PROCEDURE — C1887 CATHETER, GUIDING: HCPCS | Performed by: GENERAL PRACTICE

## 2022-10-17 PROCEDURE — 99226 PR SUBSEQUENT OBSERVATION CARE,LEVEL III: ICD-10-PCS | Mod: 25,,, | Performed by: GENERAL PRACTICE

## 2022-10-17 PROCEDURE — 85025 COMPLETE CBC W/AUTO DIFF WBC: CPT | Performed by: STUDENT IN AN ORGANIZED HEALTH CARE EDUCATION/TRAINING PROGRAM

## 2022-10-17 PROCEDURE — C1894 INTRO/SHEATH, NON-LASER: HCPCS | Performed by: GENERAL PRACTICE

## 2022-10-17 PROCEDURE — 93458 PR CATH PLACE/CORON ANGIO, IMG SUPER/INTERP,W LEFT HEART VENTRICULOGRAPHY: ICD-10-PCS | Mod: 26,,, | Performed by: GENERAL PRACTICE

## 2022-10-17 DEVICE — ANGIO-SEAL VIP VASCULAR CLOSURE DEVICE
Type: IMPLANTABLE DEVICE | Site: GROIN | Status: FUNCTIONAL
Brand: ANGIO-SEAL

## 2022-10-17 RX ORDER — MIDAZOLAM HYDROCHLORIDE 1 MG/ML
INJECTION INTRAMUSCULAR; INTRAVENOUS
Status: DISCONTINUED | OUTPATIENT
Start: 2022-10-17 | End: 2022-10-17 | Stop reason: HOSPADM

## 2022-10-17 RX ORDER — METOPROLOL TARTRATE 25 MG/1
25 TABLET, FILM COATED ORAL 2 TIMES DAILY
Qty: 60 TABLET | Refills: 11 | Status: SHIPPED | OUTPATIENT
Start: 2022-10-17 | End: 2023-10-17

## 2022-10-17 RX ORDER — IODIXANOL 320 MG/ML
INJECTION, SOLUTION INTRAVASCULAR
Status: DISCONTINUED | OUTPATIENT
Start: 2022-10-17 | End: 2022-10-17 | Stop reason: HOSPADM

## 2022-10-17 RX ORDER — SODIUM CHLORIDE 9 MG/ML
INJECTION, SOLUTION INTRAVENOUS CONTINUOUS
Status: ACTIVE | OUTPATIENT
Start: 2022-10-17 | End: 2022-10-17

## 2022-10-17 RX ORDER — LIDOCAINE HYDROCHLORIDE 10 MG/ML
INJECTION, SOLUTION EPIDURAL; INFILTRATION; INTRACAUDAL; PERINEURAL
Status: DISCONTINUED | OUTPATIENT
Start: 2022-10-17 | End: 2022-10-17 | Stop reason: HOSPADM

## 2022-10-17 RX ORDER — ASPIRIN 81 MG/1
81 TABLET ORAL DAILY
Status: DISCONTINUED | OUTPATIENT
Start: 2022-10-17 | End: 2022-10-17 | Stop reason: HOSPADM

## 2022-10-17 RX ORDER — FAMOTIDINE 20 MG/1
20 TABLET, FILM COATED ORAL 2 TIMES DAILY
Qty: 60 TABLET | Refills: 11 | Status: SHIPPED | OUTPATIENT
Start: 2022-10-17 | End: 2023-10-17

## 2022-10-17 RX ORDER — FENTANYL CITRATE 50 UG/ML
INJECTION, SOLUTION INTRAMUSCULAR; INTRAVENOUS
Status: DISCONTINUED | OUTPATIENT
Start: 2022-10-17 | End: 2022-10-17 | Stop reason: HOSPADM

## 2022-10-17 RX ORDER — ATORVASTATIN CALCIUM 40 MG/1
40 TABLET, FILM COATED ORAL DAILY
Qty: 90 TABLET | Refills: 3 | Status: SHIPPED | OUTPATIENT
Start: 2022-10-18 | End: 2023-10-18

## 2022-10-17 RX ORDER — NITROGLYCERIN 0.4 MG/1
0.4 TABLET SUBLINGUAL EVERY 5 MIN PRN
Qty: 90 TABLET | Refills: 0 | Status: SHIPPED | OUTPATIENT
Start: 2022-10-17 | End: 2022-11-16

## 2022-10-17 RX ORDER — ASPIRIN 81 MG/1
81 TABLET ORAL DAILY
Qty: 90 TABLET | Refills: 3 | Status: SHIPPED | OUTPATIENT
Start: 2022-10-18 | End: 2023-10-18

## 2022-10-17 RX ADMIN — METOPROLOL TARTRATE 25 MG: 25 TABLET, FILM COATED ORAL at 12:10

## 2022-10-17 RX ADMIN — ATORVASTATIN CALCIUM 40 MG: 40 TABLET, FILM COATED ORAL at 12:10

## 2022-10-17 RX ADMIN — FAMOTIDINE 20 MG: 20 TABLET ORAL at 12:10

## 2022-10-17 RX ADMIN — ASPIRIN 81 MG: 325 TABLET, COATED ORAL at 12:10

## 2022-10-17 RX ADMIN — TICAGRELOR 90 MG: 90 TABLET ORAL at 01:10

## 2022-10-17 NOTE — CARE UPDATE
CM called doctor Chau Rudolph office to scheduled discharge follow up.  Office voice message picked up.      CM left voice message to contact the patient to schedule a discharge follow up within 1 week from today.  Message left at 3:49PM at ph# [phone].    Spoke with patient spouse at ph#[phone] at 3:56PM.  Spouse reported that the office do not answer on Monday's.  Spouse reported that she will make sure his discharge follow up would be made within the next 7 days.

## 2022-10-17 NOTE — PLAN OF CARE
Patient cleared to discharge by case management.  Patient discharging home self care no needs.       10/17/22 1554   Final Note   Assessment Type Final Discharge Note   Anticipated Discharge Disposition Home   Post-Acute Status   Discharge Delays None known at this time

## 2022-10-17 NOTE — PLAN OF CARE
Critical access hospital  Initial Discharge Assessment       Primary Care Provider: Chau Rudolph DO    Admission Diagnosis: Chest pain [R07.9]    Admission Date: 10/15/2022  Expected Discharge Date: TBD         Payor: BLUE CROSS BLUE SHIELD / Plan: [place] / Product Type: Commercial /     Extended Emergency Contact Information  Primary Emergency Contact: Rayne Orosco  Address: [address] [place] [address] [id number] [place] [address] of [place]  Mobile Phone: [phone]  Relation: Spouse    Discharge Plan A: Home with family  Discharge Plan B: Home with family      [id number] - [place] - [address] AT [facility] [address]  [address]  [facility] [address]  Phone: [phone] Fax: [phone]      Initial Assessment (most recent)       Adult Discharge Assessment - 10/16/22 1906          Discharge Assessment    Assessment Type Discharge Planning Assessment     Confirmed/corrected address, phone number and insurance Yes     Source of Information health record     Communicated MILAGRO with patient/caregiver Date not available/Unable to determine     Reason For Admission chest pain     Lives With spouse     Facility Arrived From: home     Do you expect to return to your current living situation? Yes     Do you have help at home or someone to help you manage your care at home? Yes     Who are your caregiver(s) and their phone number(s)? spouse     Prior to hospitilization cognitive status: Alert/Oriented     Current cognitive status: Alert/Oriented     Walking or Climbing Stairs Difficulty none     Dressing/Bathing Difficulty none     Equipment Currently Used at Home none     Readmission within 30 days? No     Patient currently being followed by outpatient case management? No     Do you currently have service(s) that help you manage your care at home? No     Do you take prescription medications? Yes     Do you have prescription coverage? Yes      Do you have any problems affording any of your prescribed medications? No     Is the patient taking medications as prescribed? yes     Who is going to help you get home at discharge? spouse     How do you get to doctors appointments? car, drives self     Are you on dialysis? No     Do you take coumadin? No     Discharge Plan A Home with family     Discharge Plan B Home with family     DME Needed Upon Discharge  none

## 2022-10-17 NOTE — NURSING
PT transferred to room 2501.  Dinorah HICKEY at bedside.  Site WNL.  PT states comfort.  Tele box on.

## 2022-10-17 NOTE — PROGRESS NOTES
[facility]  Cardiology  Consult Note    Patient Name: Guanakito Orosco  MRN: [id number]  Admission Date: 10/15/2022  Hospital Length of Stay: 0 days  Code Status: Full Code   Attending Provider: Bernardo Golden, *   Consulting Provider: Jacque Eubanks NP  Primary Care Physician: Chau Rudolph DO  Principal Problem:Chest pain    Patient information was obtained from patient and ER records.     Consults  Subjective:           10/17/2022    Patient doing better. Underwent cath this am.       The ejection fraction was calculated to be 65%.    The pre-procedure left ventricular end diastolic pressure was 13.    The post-procedure left ventricular end diastolic pressure was 16.    The estimated blood loss was none.    There was non-obstructive coronary artery disease..        Right groin soft   No hematoma  Pulse palpable distal to site          10/16/2022  57-year-old male who presented to the ER with acute onset chest pain that started while walking on the ground towards his car after drinking cold liquids which lasted a while.  Troponin mildly elevated which up trended this morning but subsequently downtrended.  No more chest pain since last night except mild sharp pain that lasted a few seconds this morning.  EKG without any significant changes.  Echo reviewed, no significant wall motion abnormalities currently.    Past Medical History:   Diagnosis Date    Acid reflux     Obesity     Psoriasis     severe    Psoriatic arthritis     Urolithiasis        Past Surgical History:   Procedure Laterality Date    CIRCUMCISION      EXTRACORPOREAL SHOCK WAVE LITHOTRIPSY  2006    tristan redmond md    holmium lithotripsy L ureteral stone  8/2014    [facility] - [facility]    ureteroscopic removal of stones  2006    tristan redmond md       Review of patient's allergies indicates:   Allergen Reactions    Codeine Hives    Penicillins Hives       No current facility-administered medications on file prior  to encounter.     Current Outpatient Medications on File Prior to Encounter   Medication Sig    COSENTYX PEN, 2 PENS, 150 mg/mL PnIj Inject 300 mg into the skin every 28 days.    entanercept (ENBREL) 50 mg/mL injection Inject 50 mg into the skin once a week.    naproxen (NAPROSYN) 500 MG tablet Take 500 mg by mouth continuous prn.    omeprazole (PRILOSEC OTC) 20 MG tablet Take 20 mg by mouth once daily.     Family History    None       Tobacco Use    Smoking status: Never    Smokeless tobacco: Never   Substance and Sexual Activity    Alcohol use: No    Drug use: No    Sexual activity: Yes     Partners: Female     Review of Systems   All other systems reviewed and are negative.  Objective:     Vital Signs (Most Recent):  Temp: 99.1 °F (37.3 °C) (10/17/22 1522)  Pulse: 63 (10/17/22 1522)  Resp: 19 (10/17/22 1522)  BP: 114/60 (10/17/22 1522)  SpO2: (!) 93 % (10/17/22 1522) Vital Signs (24h Range):  Temp:  [97.5 °F (36.4 °C)-99.1 °F (37.3 °C)] 99.1 °F (37.3 °C)  Pulse:  [57-73] 63  Resp:  [13-21] 19  SpO2:  [93 %-100 %] 93 %  BP: (103-133)/(60-80) 114/60     Weight: 97.6 kg (215 lb 2.7 oz)  Body mass index is 36.93 kg/m².    SpO2: (!) 93 %  O2 Device (Oxygen Therapy): room air      Intake/Output Summary (Last 24 hours) at 10/17/2022 1531  Last data filed at 10/17/2022 1522  Gross per 24 hour   Intake 1799.52 ml   Output 450 ml   Net 1349.52 ml       Lines/Drains/Airways       Peripheral Intravenous Line  Duration                  Peripheral IV - Single Lumen 10/16/22 0255 20 G Posterior;Right Forearm 1 day         Peripheral IV - Single Lumen 10/16/22 1354 20 G Anterior;Left Forearm 1 day                    Physical Exam  Vitals reviewed.   Constitutional:       Appearance: Normal appearance.   HENT:      Mouth/Throat:      Mouth: Mucous membranes are moist.   Eyes:      Extraocular Movements: Extraocular movements intact.      Pupils: Pupils are equal, round, and reactive to light.   Cardiovascular:      Rate and  Rhythm: Normal rate and regular rhythm.      Heart sounds: No murmur heard.    No gallop.   Pulmonary:      Effort: Pulmonary effort is normal.      Breath sounds: Normal breath sounds.   Abdominal:      General: Abdomen is flat.      Palpations: Abdomen is soft.   Musculoskeletal:      Cervical back: Normal range of motion.   Skin:     General: Skin is warm and dry.   Neurological:      General: No focal deficit present.      Mental Status: He is alert and oriented to person, place, and time.   Psychiatric:         Mood and Affect: Mood normal.       Significant Labs: BMP:   Recent Labs   Lab 10/15/22  1920 10/16/22  0735   * 130*    137   K 4.0 3.8    103   CO2 20* 25   BUN 18 17   CREATININE 1.3 1.1   CALCIUM 9.3 8.5*     , CBC   Recent Labs   Lab 10/15/22  1920 10/16/22  0735 10/17/22  0219   WBC 13.07* 6.63 7.74   HGB 16.8 15.4 14.6   HCT 49.0 44.4 42.8    201 200     , and Troponin   Recent Labs   Lab 10/16/22  0031 10/16/22  0735 10/16/22  1345   TROPONINI 0.245* 0.870* 0.700*         Significant Imaging: Echocardiogram: 2D echo with color flow doppler: No results found for this or any previous visit. and Transthoracic echo (TTE) complete (Cupid Only):   Results for orders placed or performed during the hospital encounter of 10/15/22   Echo   Result Value Ref Range    LVIDd 3.49 (A) 3.5 - 6.0 cm    IVS 1.45 (A) 0.6 - 1.1 cm    Posterior Wall 1.42 (A) 0.6 - 1.1 cm    LVIDs 2.41 2.1 - 4.0 cm    LA size 3.52 cm    Ao root annulus 3.03 cm    RVDD 3.08 cm    Right Atrial Pressure (from IVC) 3 mmHg    AV mean gradient 4 mmHg    TDI LATERAL 0.14 m/s    E wave deceleration time 229.36 msec    IVRT 66.30 msec    LVOT diameter 2.00 cm    LVOT peak pb 103.64 m/s    LVOT peak VTI 23.79 cm    Ao VTI 23.65 cm    MV Peak E Pb 0.83 m/s    MV Peak A Pb 0.61 m/s    LV Systolic Volume 13.97 mL    LV Diastolic Volume 42.35 mL    TAPSE 2.01 cm    TDI SEPTAL 0.09 m/s    LV LATERAL E/E' RATIO 5.93 m/s     LV SEPTAL E/E' RATIO 9.22 m/s    FS 31 %    LV mass 179.27 g    Left Ventricle Relative Wall Thickness 0.81 cm    AV valve area 3.16 cm2    AV index (prosthetic) 1.01     E/A ratio 1.36     Mean e' 0.12 m/s    LVOT area 3.1 cm2    LVOT stroke volume 74.70 cm3    E/E' ratio 7.22 m/s    LV Systolic Volume Index 6.9 mL/m2    LV Diastolic Volume Index 20.97 mL/m2    LV Mass Index 89 g/m2    BSA 2.1 m2    EF 60 %    Narrative    · The left ventricle is normal in size with moderate concentric   hypertrophy and normal systolic function.  · The estimated ejection fraction is 60%.  · Normal left ventricular diastolic function.  · Normal right ventricular size with normal right ventricular systolic   function.  · Normal central venous pressure (3 mmHg).        Assessment and Plan:   NSTEMI- no severe obstruction seen on cardiac cath    Recommendations:    Patient can go home after bedrest on the following Brilinta 90 mg PO BID  ASA 81 mg daily  Lipitor 20 mg daily  Metoprolol 25 mg PO BID  Follow up in office in 2 weeks time.         Active Diagnoses:    Diagnosis Date Noted POA    PRINCIPAL PROBLEM:  Chest pain [R07.9] 10/16/2022 Yes    NSTEMI (non-ST elevated myocardial infarction) [I21.4] 10/17/2022 Unknown    Severe obesity (BMI >= 40) [E66.01] 10/16/2022 Yes      Problems Resolved During this Admission:       VTE Risk Mitigation (From admission, onward)           Ordered     heparin 25,000 units in dextrose 5% (100 units/ml) IV bolus from bag - ADDITIONAL PRN BOLUS - 60 units/kg (max bolus 4000 units)  As needed (PRN)        Question:  Heparin Infusion Adjustment (DO NOT MODIFY ANSWER)  Answer:  \\[email or web address]\epic\Images\Pharmacy\HeparinInfusions\heparin LOW INTENSITY nomogram for [facility] [id number].pdf    10/16/22 0942     heparin 25,000 units in dextrose 5% (100 units/ml) IV bolus from bag - ADDITIONAL PRN BOLUS - 30 units/kg (max bolus 4000 units)  As needed (PRN)        Question:  Heparin Infusion Adjustment (DO NOT  MODIFY ANSWER)  Answer:  \\[email or web address]\epic\Images\Pharmacy\HeparinInfusions\heparin LOW INTENSITY nomogram for [facility] [id number].pdf    10/16/22 0942     heparin 25,000 units in dextrose 5% 250 mL (100 units/mL) infusion LOW INTENSITY nomogram - [facility]  Continuous        Question Answer Comment   Heparin Infusion Adjustment (DO NOT MODIFY ANSWER) \\[email or web address]\epic\Images\Pharmacy\HeparinInfusions\heparin LOW INTENSITY nomogram for [facility] [id number]    Begin at (in units/kg/hr) 12        10/16/22 0942     IP VTE LOW RISK PATIENT  Once         10/16/22 0025     Place sequential compression device  Until discontinued         10/16/22 0025                  Patient reports he had chest pain onset after pushing 200 lb scaffold across football field..  Coronary angiogram reveals diffuse 80% lesion distal circ which is very small caliber less than 2 mm and medical management is recommended.  Refer to catheterization report.  Recommend medical management as described above    Jacque Eubanks NP  Cardiology   Critical access hospital

## 2022-10-20 NOTE — DISCHARGE SUMMARY
[facility]  Discharge Summary  Patient Name: Guanakito Orosco MRN: [id number]   Patient Class: OP- Observation  Length of Stay: 0   Admission Date: 10/15/2022  7:14 PM Attending Physician: Manuel Golden MD   Primary Care Provider: Chau Rudolph DO Face-to-Face encounter date: 10/17/22   Chief Complaint: Chest Pain (Pt presents to er complaining of mid sternal chest pain causing tingling in bilateral arms and hands. Pt states the pain started while driving and he attempted to drink some water and states that it feels like he has a lump in his chest. )    Date of Discharge: 10/17/22  Discharge Disposition:Home or Self Care    Condition: Stable       Reason for Hospitalization   NSTEMI      Patient Active Problem List   Diagnosis    Ureteral stone    Psoriatic arthritis    Psoriasis    Chest pain    Severe obesity (BMI >= 40)    NSTEMI (non-ST elevated myocardial infarction)       Brief History of Present Illness     Guanakito Orosco is a 57 y.o. old  male who  has a past medical history of Acid reflux, Obesity, Psoriasis, Psoriatic arthritis, and Urolithiasis.. The patient presented to [facility] on 10/15/2022 with a primary complaint of Chest Pain.  Pt presents to er complaining of mid sternal chest pain causing tingling in bilateral arms and hands. Pt states the pain started while driving after he drank some ice cold water and states that it felt like he had a lump in his chest. No ekg changes, echo wnl, trop increased.  He had angiography without occlusive disease and was discharged on appropriate medications  For the full HPI please refer to the History & Physical from this admission.    Hospital Course By Problem with Pertinent Findings       Chest Pain; NSTEMI  -continue to trend cardiac enzymes and troponin  -0.08>0.25>0.87  -2D echo complete  -no stress while trop increasing  -aspirin, statin, beta-blocker, p.r.n. nitrates  -start heparin infusion and consult  "cardiology  -NPO   -pain management with morphine  -repeat 12 lead EKG   -Patient had Cath w/o occlusive findings on 10/17  -dcd on appropriate medication regimen per cardiology      Hypertension on arrival  -no dx of HTN  -trend and may need to start antihypertensive before discharge     2. Obesity: BMI: 36.90  - wt modification plan with PCP at discharge     3. Psoriatic arthritis  -on Cosentyx    Patient was seen and examined on the date of discharge and determined to be suitable for discharge.    Physical Exam  /60 (BP Location: Right arm, Patient Position: Lying)   Pulse 63   Temp 99.1 °F (37.3 °C) (Oral)   Resp 19   Ht 5' 4" (1.626 m)   Wt 97.6 kg (215 lb 2.7 oz)   SpO2 (!) 93%   BMI 36.93 kg/m²   Vitals reviewed.    Constitutional: No distress.   HENT: Atraumatic.   Cardiovascular: Normal rate, regular rhythm and normal heart sounds.   Pulmonary/Chest: Effort normal. Clear to auscultation bilaterally. No wheezes.   Abdominal: Soft. Bowel sounds are normal. Exhibits no distension and no mass. No tenderness  Neurological: Alert.   Skin: Skin is warm and dry.     Following labs were Reviewed   No results for input(s): WBC, HGB, HCT, PLT, GLUCOSE, CALCIUM, ALBUMIN, PROT, NA, K, CO2, CL, BUN, CREATININE, ALKPHOS, ALT, AST, BILITOT in the last 24 hours.  No results found for: POCTGLUCOSE     All labs within the past 24 hours have been reviewed    Microbiology Results (last 7 days)       ** No results found for the last 168 hours. **          US Lower Extremity Veins Right   Final Result      X-Ray Chest AP Portable   Final Result          No results found for this or any previous visit.      Consultants and Procedures   Consultants:  Consults (From admission, onward)          Status Ordering Provider     Inpatient consult to Cardiology  Once        Provider:  Ottoniel Sotelo MD    Completed AMMY TRUJILLO            Procedures:   Coronary angiography    Discharge Information:   Diet:  Resume " Cardiac Diet    Physical Activity:  Activity as tolerated    Instructions:  1. Take all medications as prescribed  2. Keep all follow-up appointments  3. Return to the hospital or call your primary care physicians if any worsening symptoms such as chest pain or shortness of breath occur.      Follow-Up Appointments:  Please call your primary care physician to schedule an appointment in 1 week time.       Follow-up Information       Ottoniel Sotelo MD. Schedule an appointment as soon as possible for a visit.    Specialties: Interventional Cardiology, Cardiology  Contact information:  [address] [facility]  [address]  [place] [address] [phone]               Chau Rudolph DO. Schedule an appointment as soon as possible for a visit in 1 week(s).    Specialty: Family Medicine  Contact information:  [address] [facility]  [address]  [place] [address] [phone]                               Pending laboratory work/Tests to be performed/followed by the Primary care Physician:    The patient was discharged with discharge instructions reviewed in written and verbal form. All pertinent questions were discussed and prescriptions were provided. The importance of making follow up appointments and compliance of medications has been discussed. The patient will follow up in 1 week or sooner as needed with the PCP, and the patient is on board with the plan. Upon discharge, patient needs to be on following medications.    Discharge Medications:     Medication List        START taking these medications      aspirin 81 MG EC tablet  Commonly known as: ECOTRIN  Take 1 tablet (81 mg total) by mouth once daily.     atorvastatin 40 MG tablet  Commonly known as: LIPITOR  Take 1 tablet (40 mg total) by mouth once daily.     famotidine 20 MG tablet  Commonly known as: PEPCID  Take 1 tablet (20 mg total) by mouth 2 (two) times daily.     metoprolol tartrate 25 MG tablet  Commonly known as: LOPRESSOR  Take 1 tablet (25 mg total) by mouth 2  (two) times daily.     nitroGLYCERIN 0.4 MG SL tablet  Commonly known as: NITROSTAT  Place 1 tablet (0.4 mg total) under the tongue every 5 (five) minutes as needed for Chest pain.     ticagrelor 90 mg tablet  Commonly known as: BRILINTA  Take 1 tablet (90 mg total) by mouth 2 (two) times daily.            CONTINUE taking these medications      COSENTYX PEN (2 PENS) 150 mg/mL Pnij  Generic drug: secukinumab     etanercept 50 mg/mL (1 mL) injection  Commonly known as: ENBREL     naproxen 500 MG tablet  Commonly known as: NAPROSYN            STOP taking these medications      omeprazole 20 MG tablet  Commonly known as: PRILOSEC OTC               Where to Get Your Medications        These medications were sent to [id number]  [address] [address]      Phone: [phone]   aspirin 81 MG EC tablet  atorvastatin 40 MG tablet  famotidine 20 MG tablet  metoprolol tartrate 25 MG tablet  nitroGLYCERIN 0.4 MG SL tablet  ticagrelor 90 mg tablet           I spent 26 minutes preparing the discharge including reviewing records from previous encounters, preparation of discharge summary, assessing and final examination of the patient, discharge medicine reconciliation, discussing plan of care, follow up and education and prescriptions.       Bernardo Golden  [facility] Hospitalist  10/17/22

## 2022-10-26 ENCOUNTER — OFFICE VISIT (OUTPATIENT)
Dept: ORTHOPEDICS | Facility: CLINIC | Age: 57
End: 2022-10-26
Payer: COMMERCIAL

## 2022-10-26 VITALS
HEIGHT: 60 IN | BODY MASS INDEX: 42.21 KG/M2 | RESPIRATION RATE: 120 BRPM | SYSTOLIC BLOOD PRESSURE: 120 MMHG | WEIGHT: 215 LBS | DIASTOLIC BLOOD PRESSURE: 68 MMHG

## 2022-10-26 DIAGNOSIS — S62.345A CLOSED NONDISPLACED FRACTURE OF BASE OF FOURTH METACARPAL BONE OF LEFT HAND, INITIAL ENCOUNTER: ICD-10-CM

## 2022-10-26 DIAGNOSIS — S62.347A CLOSED NONDISPLACED FRACTURE OF BASE OF FIFTH METACARPAL BONE OF LEFT HAND, INITIAL ENCOUNTER: Primary | ICD-10-CM

## 2022-10-26 PROCEDURE — 1159F MED LIST DOCD IN RCRD: CPT | Mod: CPTII,S$GLB,, | Performed by: PHYSICIAN ASSISTANT

## 2022-10-26 PROCEDURE — 1159F PR MEDICATION LIST DOCUMENTED IN MEDICAL RECORD: ICD-10-PCS | Mod: CPTII,S$GLB,, | Performed by: PHYSICIAN ASSISTANT

## 2022-10-26 PROCEDURE — 3044F HG A1C LEVEL LT 7.0%: CPT | Mod: CPTII,S$GLB,, | Performed by: PHYSICIAN ASSISTANT

## 2022-10-26 PROCEDURE — 3074F SYST BP LT 130 MM HG: CPT | Mod: CPTII,S$GLB,, | Performed by: PHYSICIAN ASSISTANT

## 2022-10-26 PROCEDURE — 3078F PR MOST RECENT DIASTOLIC BLOOD PRESSURE < 80 MM HG: ICD-10-PCS | Mod: CPTII,S$GLB,, | Performed by: PHYSICIAN ASSISTANT

## 2022-10-26 PROCEDURE — 1160F RVW MEDS BY RX/DR IN RCRD: CPT | Mod: CPTII,S$GLB,, | Performed by: PHYSICIAN ASSISTANT

## 2022-10-26 PROCEDURE — 99203 OFFICE O/P NEW LOW 30 MIN: CPT | Mod: 57,S$GLB,, | Performed by: PHYSICIAN ASSISTANT

## 2022-10-26 PROCEDURE — 26720 PR CLOSE RX PROX/MID FING SHFT FX: ICD-10-PCS | Mod: LT,S$GLB,, | Performed by: PHYSICIAN ASSISTANT

## 2022-10-26 PROCEDURE — 3078F DIAST BP <80 MM HG: CPT | Mod: CPTII,S$GLB,, | Performed by: PHYSICIAN ASSISTANT

## 2022-10-26 PROCEDURE — 26720 TREAT FINGER FRACTURE EACH: CPT | Mod: LT,S$GLB,, | Performed by: PHYSICIAN ASSISTANT

## 2022-10-26 PROCEDURE — 1160F PR REVIEW ALL MEDS BY PRESCRIBER/CLIN PHARMACIST DOCUMENTED: ICD-10-PCS | Mod: CPTII,S$GLB,, | Performed by: PHYSICIAN ASSISTANT

## 2022-10-26 PROCEDURE — 99203 PR OFFICE/OUTPT VISIT, NEW, LEVL III, 30-44 MIN: ICD-10-PCS | Mod: 57,S$GLB,, | Performed by: PHYSICIAN ASSISTANT

## 2022-10-26 PROCEDURE — 3044F PR MOST RECENT HEMOGLOBIN A1C LEVEL <7.0%: ICD-10-PCS | Mod: CPTII,S$GLB,, | Performed by: PHYSICIAN ASSISTANT

## 2022-10-26 PROCEDURE — 3074F PR MOST RECENT SYSTOLIC BLOOD PRESSURE < 130 MM HG: ICD-10-PCS | Mod: CPTII,S$GLB,, | Performed by: PHYSICIAN ASSISTANT

## 2022-10-26 NOTE — PROGRESS NOTES
[facility]  [address]  [place] [address]  Phone: [phone]   [phone]    Patient's PCP: Chau Rudolph DO  Referring Provider: No ref. provider found    Subjective:      Chief Complaint:   Chief Complaint   Patient presents with    Left Hand - Pain, Injury     Patient is here with complaints of Left hand, was knocked over by the dog on Saturday night, states he has 2 fractures in his hand       Past Medical History:   Diagnosis Date    Acid reflux     Obesity     Psoriasis     severe    Psoriatic arthritis     Urolithiasis        Past Surgical History:   Procedure Laterality Date    ANGIOGRAM, CORONARY, WITH LEFT HEART CATHETERIZATION N/A 10/17/2022    Procedure: Angiogram, Coronary, with Left Heart Cath;  Surgeon: Rivera Curtis MD;  Location: [facility] [place] CATH/EP LAB;  Service: Cardiology;  Laterality: N/A;    CIRCUMCISION      EXTRACORPOREAL SHOCK WAVE LITHOTRIPSY  2006    tristan redmond md    holmium lithotripsy L ureteral stone  8/2014    [facility] - [facility]    ureteroscopic removal of stones  2006    tristan redmond md       Current Outpatient Medications   Medication Sig    aspirin (ECOTRIN) 81 MG EC tablet Take 1 tablet (81 mg total) by mouth once daily.    atorvastatin (LIPITOR) 40 MG tablet Take 1 tablet (40 mg total) by mouth once daily.    COSENTYX PEN, 2 PENS, 150 mg/mL PnIj Inject 300 mg into the skin every 28 days.    entanercept (ENBREL) 50 mg/mL injection Inject 50 mg into the skin once a week.    famotidine (PEPCID) 20 MG tablet Take 1 tablet (20 mg total) by mouth 2 (two) times daily.    naproxen (NAPROSYN) 500 MG tablet Take 500 mg by mouth continuous prn.    nitroGLYCERIN (NITROSTAT) 0.4 MG SL tablet Place 1 tablet (0.4 mg total) under the tongue every 5 (five) minutes as needed for Chest pain.    ticagrelor (BRILINTA) 90 mg tablet Take 1 tablet (90 mg total) by mouth 2 (two) times daily.    metoprolol tartrate (LOPRESSOR) 25 MG tablet Take 1 tablet  (25 mg total) by mouth 2 (two) times daily.     No current facility-administered medications for this visit.       Review of patient's allergies indicates:   Allergen Reactions    Allopurinol Other (See Comments)     Elevates his Blood Pressure    Codeine Hives    Penicillins Hives       History reviewed. No pertinent family history.    Social History     Socioeconomic History    Marital status:    Tobacco Use    Smoking status: Never    Smokeless tobacco: Never   Substance and Sexual Activity    Alcohol use: No    Drug use: No    Sexual activity: Yes     Partners: Female       History of present illness:  Mr. Orosco presents to the clinic today as a new patient chief complaint of left hand pain after he suffered a fall 4 days ago at home after he tripped over his dog.  He was seen at a local urgent care, x-rayed, diagnosed with a fracture of the 4th and 5th metacarpal and placed in an Ace bandage in sling.  He was referred on to Orthopedics for further evaluation and treatment.  He presents to the clinic today with the left hand wrapped with an Ace bandage and sling in place to the left upper extremity.    Review of Systems:    Constitutional: Negative for chills, fever and weight loss.   HENT: Negative for congestion.    Eyes: Negative for discharge and redness.   Respiratory: Negative for cough and shortness of breath.    Cardiovascular: Negative for chest pain.   Gastrointestinal: Negative for nausea and vomiting.   Musculoskeletal: See HPI.   Skin: Negative for rash.   Neurological: Negative for headaches.   Endo/Heme/Allergies: Does not bruise/bleed easily.   Psychiatric/Behavioral: The patient is not nervous/anxious.    All other systems reviewed and are negative.       Objective:      Physical Examination:    Vital Signs:    Vitals:    10/26/22 0839   BP: 120/68   Resp: (!) 120       Body mass index is 41.99 kg/m².    This a well-developed, well nourished patient in no acute distress.  They are alert  and oriented and cooperative to examination.     Left hand exam:  Skin to his left hand is clean dry and intact.  There is no erythema.  He does have ecchymosis about the palmar aspect of the left hand.  He has diffuse soft tissue swelling throughout the left hand.  He can fully open and close his left hand, however, it is painful for him at the base of the 4th and 5th digits.  He is able to oppose his left thumb to all digits in his left hand.  He is neurovascularly intact throughout the left upper extremity.  He has no anatomical snuffbox tenderness on the left hand.  He can flex/extend at the left wrist without pain.    Pertinent New Results:        XRAY Report / Interpretation:   Three views were taken of the left hand today:  AP, lateral, oblique views.  They reveal a nondisplaced fracture at the base of the left 5th metacarpal and left 4th metacarpal.  It is extra-articular.  It is not comminuted.      Assessment:       1. Closed nondisplaced fracture of base of fifth metacarpal bone of left hand, initial encounter    2. Closed nondisplaced fracture of base of fourth metacarpal bone of left hand, initial encounter      Plan:     Closed nondisplaced fracture of base of fifth metacarpal bone of left hand, initial encounter  -     X-Ray Hand 3 view Left    Closed nondisplaced fracture of base of fourth metacarpal bone of left hand, initial encounter      Follow up in about 2 weeks (around 11/9/2022), or [facility] for FX CARE, LT finger fx f/u< XRAY.    I placed his left upper extremity/hand in Velcro ulnar gutter splint to be worn at all times.  Advised he can remove it once a day briefly for personal hygiene/showering purposes.  I would like to check him back in 2 weeks with repeat x-rays just to ensure proper healing and no displacement of the fracture fragments.  This should be able to be treated in a closed/conservative manner without surgical intervention.        Rivera Magallon, AMBERS, PA-C    This  note was created using [employer] voice recognition software that occasionally misinterprets words or phrases.

## 2022-11-11 ENCOUNTER — OFFICE VISIT (OUTPATIENT)
Dept: ORTHOPEDICS | Facility: CLINIC | Age: 57
End: 2022-11-11
Payer: COMMERCIAL

## 2022-11-11 VITALS
SYSTOLIC BLOOD PRESSURE: 110 MMHG | BODY MASS INDEX: 42.21 KG/M2 | DIASTOLIC BLOOD PRESSURE: 74 MMHG | HEIGHT: 60 IN | WEIGHT: 215 LBS

## 2022-11-11 DIAGNOSIS — S62.345A CLOSED NONDISPLACED FRACTURE OF BASE OF FOURTH METACARPAL BONE OF LEFT HAND, INITIAL ENCOUNTER: ICD-10-CM

## 2022-11-11 DIAGNOSIS — S62.347A CLOSED NONDISPLACED FRACTURE OF BASE OF FIFTH METACARPAL BONE OF LEFT HAND, INITIAL ENCOUNTER: Primary | ICD-10-CM

## 2022-11-11 PROCEDURE — 3008F BODY MASS INDEX DOCD: CPT | Mod: CPTII,S$GLB,, | Performed by: PHYSICIAN ASSISTANT

## 2022-11-11 PROCEDURE — 3078F DIAST BP <80 MM HG: CPT | Mod: CPTII,S$GLB,, | Performed by: PHYSICIAN ASSISTANT

## 2022-11-11 PROCEDURE — 3044F PR MOST RECENT HEMOGLOBIN A1C LEVEL <7.0%: ICD-10-PCS | Mod: CPTII,S$GLB,, | Performed by: PHYSICIAN ASSISTANT

## 2022-11-11 PROCEDURE — 3044F HG A1C LEVEL LT 7.0%: CPT | Mod: CPTII,S$GLB,, | Performed by: PHYSICIAN ASSISTANT

## 2022-11-11 PROCEDURE — 3074F PR MOST RECENT SYSTOLIC BLOOD PRESSURE < 130 MM HG: ICD-10-PCS | Mod: CPTII,S$GLB,, | Performed by: PHYSICIAN ASSISTANT

## 2022-11-11 PROCEDURE — 99024 POSTOP FOLLOW-UP VISIT: CPT | Mod: S$GLB,,, | Performed by: PHYSICIAN ASSISTANT

## 2022-11-11 PROCEDURE — 99024 PR POST-OP FOLLOW-UP VISIT: ICD-10-PCS | Mod: S$GLB,,, | Performed by: PHYSICIAN ASSISTANT

## 2022-11-11 PROCEDURE — 3078F PR MOST RECENT DIASTOLIC BLOOD PRESSURE < 80 MM HG: ICD-10-PCS | Mod: CPTII,S$GLB,, | Performed by: PHYSICIAN ASSISTANT

## 2022-11-11 PROCEDURE — 1159F PR MEDICATION LIST DOCUMENTED IN MEDICAL RECORD: ICD-10-PCS | Mod: CPTII,S$GLB,, | Performed by: PHYSICIAN ASSISTANT

## 2022-11-11 PROCEDURE — 1160F PR REVIEW ALL MEDS BY PRESCRIBER/CLIN PHARMACIST DOCUMENTED: ICD-10-PCS | Mod: CPTII,S$GLB,, | Performed by: PHYSICIAN ASSISTANT

## 2022-11-11 PROCEDURE — 1160F RVW MEDS BY RX/DR IN RCRD: CPT | Mod: CPTII,S$GLB,, | Performed by: PHYSICIAN ASSISTANT

## 2022-11-11 PROCEDURE — 1159F MED LIST DOCD IN RCRD: CPT | Mod: CPTII,S$GLB,, | Performed by: PHYSICIAN ASSISTANT

## 2022-11-11 PROCEDURE — 3008F PR BODY MASS INDEX (BMI) DOCUMENTED: ICD-10-PCS | Mod: CPTII,S$GLB,, | Performed by: PHYSICIAN ASSISTANT

## 2022-11-11 PROCEDURE — 3074F SYST BP LT 130 MM HG: CPT | Mod: CPTII,S$GLB,, | Performed by: PHYSICIAN ASSISTANT

## 2022-11-11 NOTE — PROGRESS NOTES
[facility]  [address]  [place] [address]  Phone: [phone]   [phone]    Patient's PCP: Chau Rudolph DO  Referring Provider: No ref. provider found    Subjective:      Chief Complaint:   Chief Complaint   Patient presents with    Left Hand - Pain     Patient is here for a f/up on Left Little Finger Fracture, states he has no pain while wearing his splint but is painful once he takes it off       Past Medical History:   Diagnosis Date    Acid reflux     Obesity     Psoriasis     severe    Psoriatic arthritis     Urolithiasis        Past Surgical History:   Procedure Laterality Date    ANGIOGRAM, CORONARY, WITH LEFT HEART CATHETERIZATION N/A 10/17/2022    Procedure: Angiogram, Coronary, with Left Heart Cath;  Surgeon: Rivera Curtis MD;  Location: [facility] CATH/EP LAB;  Service: Cardiology;  Laterality: N/A;    CIRCUMCISION      EXTRACORPOREAL SHOCK WAVE LITHOTRIPSY  2006    tristan redmond md    holmium lithotripsy L ureteral stone  8/2014    [facility] - [facility]    ureteroscopic removal of stones  2006    tristan redmond md       Current Outpatient Medications   Medication Sig    aspirin (ECOTRIN) 81 MG EC tablet Take 1 tablet (81 mg total) by mouth once daily.    atorvastatin (LIPITOR) 40 MG tablet Take 1 tablet (40 mg total) by mouth once daily.    COSENTYX PEN, 2 PENS, 150 mg/mL PnIj Inject 300 mg into the skin every 28 days.    entanercept (ENBREL) 50 mg/mL injection Inject 50 mg into the skin once a week.    famotidine (PEPCID) 20 MG tablet Take 1 tablet (20 mg total) by mouth 2 (two) times daily.    metoprolol tartrate (LOPRESSOR) 25 MG tablet Take 1 tablet (25 mg total) by mouth 2 (two) times daily.    naproxen (NAPROSYN) 500 MG tablet Take 500 mg by mouth continuous prn.    nitroGLYCERIN (NITROSTAT) 0.4 MG SL tablet Place 1 tablet (0.4 mg total) under the tongue every 5 (five) minutes as needed for Chest pain.    ticagrelor (BRILINTA) 90 mg tablet Take 1  tablet (90 mg total) by mouth 2 (two) times daily.     No current facility-administered medications for this visit.       Review of patient's allergies indicates:   Allergen Reactions    Allopurinol Other (See Comments)     Elevates his Blood Pressure    Codeine Hives    Penicillins Hives       History reviewed. No pertinent family history.    Social History     Socioeconomic History    Marital status:    Tobacco Use    Smoking status: Never    Smokeless tobacco: Never   Substance and Sexual Activity    Alcohol use: No    Drug use: No    Sexual activity: Yes     Partners: Female       History of present illness:  Mr. Calarbese comes in today for follow-up for his left hand.  He is wearing his ulnar gutter splint.  He is here today for repeat x-rays to evaluate his 4th and 5th metacarpal fractures.  Reports he has no pain while wearing his brace.  It is tender for me comes out of his brace for his personal hygiene purposes once a day.  This is within normal limits.    Review of Systems:    Constitutional: Negative for chills, fever and weight loss.   HENT: Negative for congestion.    Eyes: Negative for discharge and redness.   Respiratory: Negative for cough and shortness of breath.    Cardiovascular: Negative for chest pain.   Gastrointestinal: Negative for nausea and vomiting.   Musculoskeletal: See HPI.   Skin: Negative for rash.   Neurological: Negative for headaches.   Endo/Heme/Allergies: Does not bruise/bleed easily.   Psychiatric/Behavioral: The patient is not nervous/anxious.    All other systems reviewed and are negative.       Objective:      Physical Examination:    Vital Signs:    Vitals:    11/11/22 0925   BP: 110/74       Body mass index is 41.99 kg/m².    This a well-developed, well nourished patient in no acute distress.  They are alert and oriented and cooperative to examination.     Left hand exam:  His left hand exam is similar to where he was on his initial presentation approximately 2 weeks  ago. Skin to his left hand is clean dry and intact.  There is no erythema.  He does have ecchymosis about the palmar aspect of the left hand, yet improved from his initial visit.  He has diffuse soft tissue swelling throughout the left hand, but it is improved from initial visit.  He can fully open and close his left hand, however, it is painful for him at the base of the 4th and 5th digits.  He is able to oppose his left thumb to all digits in his left hand.  He is neurovascularly intact throughout the left upper extremity.  He has no anatomical snuffbox tenderness on the left hand.  He can flex/extend at the left wrist without pain.    Pertinent New Results:        XRAY Report / Interpretation:   Three views were taken of the left hand today:  AP, lateral, and oblique views.  They reveal no further displacement of the fractures at the base of the 4th and 5th metacarpals.  No calluses appreciated either fracture site just yet.  There are no new fractures or dislocations appreciated from previous images.      Assessment:       1. Closed nondisplaced fracture of base of fifth metacarpal bone of left hand, initial encounter    2. Closed nondisplaced fracture of base of fourth metacarpal bone of left hand, initial encounter      Plan:     Closed nondisplaced fracture of base of fifth metacarpal bone of left hand, initial encounter  -     X-Ray Hand 3 view Left    Closed nondisplaced fracture of base of fourth metacarpal bone of left hand, initial encounter  -     X-Ray Hand 3 view Left      Follow up in about 4 weeks (around 12/9/2022) for WITH PAM w/X-Ray.    Continue left ulnar gutter splint at all times except for removal once a day for personal hygiene purposes.  Advised him to continue sleeping in this as well.  Plan is to continue to treat this in a closed/conservative manner with ulnar gutter splint.  We will check him back in 4 weeks with repeat x-rays of his left hand to ensure healing of the fracture sites.   Plan would be to discontinue the ulnar gutter splint at that time and begin gentle range of motion exercises over the next several weeks before releasing to full use of the hand at about 8 weeks post injury.        KARAN Lutz, PA-C    This note was created using [employer] voice recognition software that occasionally misinterprets words or phrases.

## 2022-12-09 ENCOUNTER — OFFICE VISIT (OUTPATIENT)
Dept: ORTHOPEDICS | Facility: CLINIC | Age: 57
End: 2022-12-09
Payer: COMMERCIAL

## 2022-12-09 VITALS
DIASTOLIC BLOOD PRESSURE: 81 MMHG | SYSTOLIC BLOOD PRESSURE: 138 MMHG | WEIGHT: 215 LBS | HEIGHT: 60 IN | BODY MASS INDEX: 42.21 KG/M2

## 2022-12-09 DIAGNOSIS — S62.347A CLOSED NONDISPLACED FRACTURE OF BASE OF FIFTH METACARPAL BONE OF LEFT HAND, INITIAL ENCOUNTER: Primary | ICD-10-CM

## 2022-12-09 DIAGNOSIS — S62.345A CLOSED NONDISPLACED FRACTURE OF BASE OF FOURTH METACARPAL BONE OF LEFT HAND, INITIAL ENCOUNTER: ICD-10-CM

## 2022-12-09 PROCEDURE — 99024 PR POST-OP FOLLOW-UP VISIT: ICD-10-PCS | Mod: S$GLB,,, | Performed by: PHYSICIAN ASSISTANT

## 2022-12-09 PROCEDURE — 3008F PR BODY MASS INDEX (BMI) DOCUMENTED: ICD-10-PCS | Mod: CPTII,S$GLB,, | Performed by: PHYSICIAN ASSISTANT

## 2022-12-09 PROCEDURE — 1160F RVW MEDS BY RX/DR IN RCRD: CPT | Mod: CPTII,S$GLB,, | Performed by: PHYSICIAN ASSISTANT

## 2022-12-09 PROCEDURE — 3079F DIAST BP 80-89 MM HG: CPT | Mod: CPTII,S$GLB,, | Performed by: PHYSICIAN ASSISTANT

## 2022-12-09 PROCEDURE — 3044F HG A1C LEVEL LT 7.0%: CPT | Mod: CPTII,S$GLB,, | Performed by: PHYSICIAN ASSISTANT

## 2022-12-09 PROCEDURE — 1159F MED LIST DOCD IN RCRD: CPT | Mod: CPTII,S$GLB,, | Performed by: PHYSICIAN ASSISTANT

## 2022-12-09 PROCEDURE — 3044F PR MOST RECENT HEMOGLOBIN A1C LEVEL <7.0%: ICD-10-PCS | Mod: CPTII,S$GLB,, | Performed by: PHYSICIAN ASSISTANT

## 2022-12-09 PROCEDURE — 3008F BODY MASS INDEX DOCD: CPT | Mod: CPTII,S$GLB,, | Performed by: PHYSICIAN ASSISTANT

## 2022-12-09 PROCEDURE — 1159F PR MEDICATION LIST DOCUMENTED IN MEDICAL RECORD: ICD-10-PCS | Mod: CPTII,S$GLB,, | Performed by: PHYSICIAN ASSISTANT

## 2022-12-09 PROCEDURE — 3075F SYST BP GE 130 - 139MM HG: CPT | Mod: CPTII,S$GLB,, | Performed by: PHYSICIAN ASSISTANT

## 2022-12-09 PROCEDURE — 3079F PR MOST RECENT DIASTOLIC BLOOD PRESSURE 80-89 MM HG: ICD-10-PCS | Mod: CPTII,S$GLB,, | Performed by: PHYSICIAN ASSISTANT

## 2022-12-09 PROCEDURE — 99024 POSTOP FOLLOW-UP VISIT: CPT | Mod: S$GLB,,, | Performed by: PHYSICIAN ASSISTANT

## 2022-12-09 PROCEDURE — 1160F PR REVIEW ALL MEDS BY PRESCRIBER/CLIN PHARMACIST DOCUMENTED: ICD-10-PCS | Mod: CPTII,S$GLB,, | Performed by: PHYSICIAN ASSISTANT

## 2022-12-09 PROCEDURE — 3075F PR MOST RECENT SYSTOLIC BLOOD PRESS GE 130-139MM HG: ICD-10-PCS | Mod: CPTII,S$GLB,, | Performed by: PHYSICIAN ASSISTANT

## 2022-12-09 NOTE — PROGRESS NOTES
[facility]ED[facility]  [address]  [place] [address]  Phone: [phone]   [phone]    Patient's PCP: Chau Rudolph DO  Referring Provider: No ref. provider found    Subjective:      Chief Complaint:   Chief Complaint   Patient presents with    Left Hand - Injury     FXC left little finger and left ring finger fx DOI 10/26/22. States that he is doing well       Past Medical History:   Diagnosis Date    Acid reflux     Obesity     Psoriasis     severe    Psoriatic arthritis     Urolithiasis        Past Surgical History:   Procedure Laterality Date    ANGIOGRAM, CORONARY, WITH LEFT HEART CATHETERIZATION N/A 10/17/2022    Procedure: Angiogram, Coronary, with Left Heart Cath;  Surgeon: Rivera Curtis MD;  Location: [facility] CATH/EP LAB;  Service: Cardiology;  Laterality: N/A;    CIRCUMCISION      EXTRACORPOREAL SHOCK WAVE LITHOTRIPSY  2006    tristan redmond md    holmium lithotripsy L ureteral stone  8/2014    [facility] - [facility]    ureteroscopic removal of stones  2006    tristan redmond md       Current Outpatient Medications   Medication Sig    aspirin (ECOTRIN) 81 MG EC tablet Take 1 tablet (81 mg total) by mouth once daily.    atorvastatin (LIPITOR) 40 MG tablet Take 1 tablet (40 mg total) by mouth once daily.    COSENTYX PEN, 2 PENS, 150 mg/mL PnIj Inject 300 mg into the skin every 28 days.    entanercept (ENBREL) 50 mg/mL injection Inject 50 mg into the skin once a week.    famotidine (PEPCID) 20 MG tablet Take 1 tablet (20 mg total) by mouth 2 (two) times daily.    metoprolol tartrate (LOPRESSOR) 25 MG tablet Take 1 tablet (25 mg total) by mouth 2 (two) times daily.    naproxen (NAPROSYN) 500 MG tablet Take 500 mg by mouth continuous prn.    ticagrelor (BRILINTA) 90 mg tablet Take 1 tablet (90 mg total) by mouth 2 (two) times daily.    nitroGLYCERIN (NITROSTAT) 0.4 MG SL tablet Place 1 tablet (0.4 mg total) under the tongue every 5 (five) minutes as needed for Chest pain.      No current facility-administered medications for this visit.       Review of patient's allergies indicates:   Allergen Reactions    Allopurinol Other (See Comments)     Elevates his Blood Pressure    Codeine Hives    Penicillins Hives       History reviewed. No pertinent family history.    Social History     Socioeconomic History    Marital status:    Tobacco Use    Smoking status: Never    Smokeless tobacco: Never   Substance and Sexual Activity    Alcohol use: No    Drug use: No    Sexual activity: Yes     Partners: Female       History of present illness:  [place] comes in today for follow-up for fractures to his left 4th and 5th digits.  He is approximately 6 weeks out from his initial injury.  He has been wearing an ulnar gutter splint.  His pain is much improved.  He is here today for repeat x-rays.    Review of Systems:    Constitutional: Negative for chills, fever and weight loss.   HENT: Negative for congestion.    Eyes: Negative for discharge and redness.   Respiratory: Negative for cough and shortness of breath.    Cardiovascular: Negative for chest pain.   Gastrointestinal: Negative for nausea and vomiting.   Musculoskeletal: See HPI.   Skin: Negative for rash.   Neurological: Negative for headaches.   Endo/Heme/Allergies: Does not bruise/bleed easily.   Psychiatric/Behavioral: The patient is not nervous/anxious.    All other systems reviewed and are negative.       Objective:      Physical Examination:    Vital Signs:    Vitals:    12/09/22 0902   BP: 138/81       Body mass index is 41.99 kg/m².    This a well-developed, well nourished patient in no acute distress.  They are alert and oriented and cooperative to examination.     Left hand exam: Skin to his left hand is clean dry and intact.  There is no erythema or ecchymosis.  There are no signs or symptoms of infection.  Patient is neurovascularly intact throughout the left upper extremity.  He can fully close his left hand into a fist.  Can  oppose his left thumb to all digits in his left hand.  He can pronate/supinate the left forearm and flex/extend at the left wrist all without difficulty.  He has very mild tenderness to palpation near the base of the 4th and 5th metacarpals.    Pertinent New Results:        XRAY Report / Interpretation:   Three views were taken of his left hand today: AP, lateral, and oblique.  They reveal a healing fracture at the base of the 4th and 5th metacarpals.  No interval change/displacement has occurred.  Visualized soft tissues appear unremarkable.      Assessment:       1. Closed nondisplaced fracture of base of fifth metacarpal bone of left hand, initial encounter    2. Closed nondisplaced fracture of base of fourth metacarpal bone of left hand, initial encounter      Plan:     Closed nondisplaced fracture of base of fifth metacarpal bone of left hand, initial encounter  -     X-Ray Hand 3 view Left    Closed nondisplaced fracture of base of fourth metacarpal bone of left hand, initial encounter  -     X-Ray Hand 3 view Left      Follow up if symptoms worsen or fail to improve.    Patient can discontinue use of his ulnar gutter splint on his dominant left hand.  He can begin to resume his regular activities of daily living.  Advised him to wear the splint over the next 2 weeks if he is going to be doing any type of strenuous manual labor.  Can definitely come out of it for sleeping and personal hygiene purposes.  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  Can gently progress his activity level over the next 2 weeks as pain dictates.  Can follow up with us on an as-needed basis if any issues or concerns arise.        Rivera Magallon, MPAS, PA-C    This note was created using [employer] voice recognition software that occasionally misinterprets words or phrases.

## 2023-01-23 PROBLEM — I21.4 NSTEMI (NON-ST ELEVATED MYOCARDIAL INFARCTION): Status: RESOLVED | Noted: 2022-10-17 | Resolved: 2023-01-23

## 2023-03-17 ENCOUNTER — PATIENT MESSAGE (OUTPATIENT)
Dept: RESEARCH | Facility: HOSPITAL | Age: 58
End: 2023-03-17

## 2024-12-30 PROBLEM — E66.01 CLASS 2 SEVERE OBESITY DUE TO EXCESS CALORIES WITH SERIOUS COMORBIDITY AND BODY MASS INDEX (BMI) OF 36.0 TO 36.9 IN ADULT: Status: ACTIVE | Noted: 2022-10-16

## 2024-12-30 PROBLEM — E66.01 CLASS 2 SEVERE OBESITY DUE TO EXCESS CALORIES WITH SERIOUS COMORBIDITY AND BODY MASS INDEX (BMI) OF 36.0 TO 36.9 IN ADULT: Status: ACTIVE | Noted: 2022-10-27

## 2024-12-30 PROBLEM — E66.812 CLASS 2 SEVERE OBESITY DUE TO EXCESS CALORIES WITH SERIOUS COMORBIDITY AND BODY MASS INDEX (BMI) OF 36.0 TO 36.9 IN ADULT: Status: ACTIVE | Noted: 2022-10-27

## 2024-12-30 PROBLEM — E66.812 CLASS 2 SEVERE OBESITY DUE TO EXCESS CALORIES WITH SERIOUS COMORBIDITY AND BODY MASS INDEX (BMI) OF 36.0 TO 36.9 IN ADULT: Status: ACTIVE | Noted: 2022-10-16

## 2024-12-30 PROBLEM — K21.9 GASTROESOPHAGEAL REFLUX DISEASE: Status: ACTIVE | Noted: 2022-10-27

## 2024-12-30 PROBLEM — M54.50 CHRONIC RIGHT-SIDED LOW BACK PAIN WITHOUT SCIATICA: Status: ACTIVE | Noted: 2024-04-08

## 2024-12-30 PROBLEM — E11.65 TYPE 2 DIABETES MELLITUS WITH HYPERGLYCEMIA, WITHOUT LONG-TERM CURRENT USE OF INSULIN: Status: ACTIVE | Noted: 2022-10-27

## 2024-12-30 PROBLEM — G89.29 CHRONIC RIGHT-SIDED LOW BACK PAIN WITHOUT SCIATICA: Status: ACTIVE | Noted: 2024-04-08

## 2024-12-30 NOTE — PROGRESS NOTES
"Subjective      Guanakito Orosco is a 59 y.o. male        Chief Complaint   Patient presents with    Establish Care        HPI     Patient presents today to establish care.      Active Problem List with Overview Notes    Diagnosis Date Noted    Mixed hyperlipidemia due to type 2 diabetes mellitus 12/31/2024     Patient with history of hyperlipidemia. The patient reports compliance with Atorvastatin. Last lipid panel listed below.     Lab Results   Component Value Date    CHOL 178 11/09/2023     Lab Results   Component Value Date    HDL 40 11/09/2023     Lab Results   Component Value Date    LDLCALC 107 (H) 11/09/2023     Lab Results   Component Value Date    TRIG 175 (H) 11/09/2023       No results found for: "CHOLHDL"         Chronic right-sided low back pain without sciatica 04/08/2024    Type 2 diabetes mellitus with hyperglycemia, without long-term current use of insulin 10/27/2022     Patient with history of type 2 diabetes.  Last A1c listed below.  He is not currently on any medications for this concern.  He has performed lifestyle modifications in the form of diet and exercise.    Lab Results   Component Value Date    HGBA1C 6.9 (H) 11/09/2023      Gastroesophageal reflux disease 10/27/2022     Patient with history of GERD.  He has previously been on famotidine for this concern.  He however reports that his medication is no longer helping with this concern.  He would like to restart his omeprazole, which he took on an as-needed basis.      NSTEMI (non-ST elevated myocardial infarction) 10/17/2022     Patient with a history of NSTEMI.  This was noted on recent hospitalization on 10/17/2022.  Patient was assessed by cardiology during that visit with recommendations for medication management including aspirin, Brilinta, atorvastatin, and metoprolol.     Since last visit, patient has followed up with Cardiology Dr. Quintanilla, whom has recommended stopping metoprolol and Brilinta.  He has a wanted you with both " aspirin and atorvastatin.      Chest pain 10/16/2022    Class 2 severe obesity due to excess calories with serious comorbidity and body mass index (BMI) of 36.0 to 36.9 in adult 10/16/2022     Patient with history of elevated BMI. The patient denies regular exercise and dietary modifications. Last recorded BMI listed below.        Body mass index is 36.56 kg/m².        Psoriatic arthritis 08/26/2014     Patient with history of psoriatic arthritis.  He is currently followed by dermatology for this concern.  He reports well-controlled on Bimzelx.  Patient also has as needed naproxen with inconsistent use.      Psoriasis 08/26/2014    Ureteral stone 08/21/2014            ALLERGIES  Allopurinol, Codeine, and Penicillins     MEDICATIONS  Outpatient Encounter Medications as of 12/31/2024   Medication Sig Dispense Refill    aspirin (ECOTRIN) 81 MG EC tablet Take 1 tablet (81 mg total) by mouth once daily. 90 tablet 3    bimekizumab-bkzx (BIMZELX AUTOINJECTOR) 160 mg/mL AtIn       clobetasol 0.05% (TEMOVATE) 0.05 % Oint 1 efren applied topically to affected areas on body 2 times a day only PRN flare ups for 14 days      famotidine (PEPCID) 20 MG tablet Take 1 tablet (20 mg total) by mouth 2 (two) times daily. 60 tablet 11    [DISCONTINUED] atorvastatin (LIPITOR) 40 MG tablet Take 1 tablet (40 mg total) by mouth once daily. 90 tablet 3    [DISCONTINUED] naproxen (NAPROSYN) 500 MG tablet Take 500 mg by mouth continuous prn.      atorvastatin (LIPITOR) 40 MG tablet Take 1 tablet (40 mg total) by mouth once daily. 90 tablet 3    naproxen (NAPROSYN) 500 MG tablet Take 1 tablet (500 mg total) by mouth daily as needed (pain). 90 tablet 3    [DISCONTINUED] COSENTYX PEN, 2 PENS, 150 mg/mL PnIj Inject 300 mg into the skin every 28 days.      [DISCONTINUED] entanercept (ENBREL) 50 mg/mL injection Inject 50 mg into the skin once a week.      [DISCONTINUED] metoprolol tartrate (LOPRESSOR) 25 MG tablet Take 1 tablet (25 mg total) by mouth  2 (two) times daily. 60 tablet 11    [DISCONTINUED] nitroGLYCERIN (NITROSTAT) 0.4 MG SL tablet Place 1 tablet (0.4 mg total) under the tongue every 5 (five) minutes as needed for Chest pain. 90 tablet 0    [DISCONTINUED] ticagrelor (BRILINTA) 90 mg tablet Take 1 tablet (90 mg total) by mouth 2 (two) times daily. 60 tablet 11     No facility-administered encounter medications on file as of 12/31/2024.        PAST MEDICAL HISTORY  Past Medical History:   Diagnosis Date    Acid reflux     Obesity     Psoriasis     severe    Psoriatic arthritis     Urolithiasis         PAST SURGIAL HISTORY  Past Surgical History:   Procedure Laterality Date    ANGIOGRAM, CORONARY, WITH LEFT HEART CATHETERIZATION N/A 10/17/2022    Procedure: Angiogram, Coronary, with Left Heart Cath;  Surgeon: Rivera Curtis MD;  Location: [facility] CATH/EP LAB;  Service: Cardiology;  Laterality: N/A;    CIRCUMCISION      EXTRACORPOREAL SHOCK WAVE LITHOTRIPSY  2006    tristan redmond md    holmium lithotripsy L ureteral stone  8/2014    [facility] - [facility]    ureteroscopic removal of stones  2006    tristan redmond md        FAMILY HISTORY  No family history on file.     PAST SOCIAL HISTORY  Social History     Socioeconomic History    Marital status:    Tobacco Use    Smoking status: Never    Smokeless tobacco: Never   Substance and Sexual Activity    Alcohol use: No    Drug use: No    Sexual activity: Yes     Partners: Female     Social Drivers of Health     Financial Resource Strain: Low Risk  (12/30/2024)    Overall Financial Resource Strain (CARDIA)     Difficulty of Paying Living Expenses: Not hard at all   Food Insecurity: No Food Insecurity (12/30/2024)    Hunger Vital Sign     Worried About Running Out of Food in the Last Year: Never true     Ran Out of Food in the Last Year: Never true   Physical Activity: Insufficiently Active (12/30/2024)    Exercise Vital Sign     Days of Exercise per Week: 2 days     Minutes of Exercise per  "Session: 30 min   Stress: No Stress Concern Present (12/30/2024)    [employer] - Occupational Stress Questionnaire     Feeling of Stress : Not at all   Housing Stability: Unknown (12/30/2024)    Housing Stability Vital Sign     Unable to Pay for Housing in the Last Year: No        Health Maintenance   Topic Date Due    HIV Screening  Never done    TETANUS VACCINE  Never done    High Dose Statin  Never done    Shingles Vaccine (1 of 2) Never done    Pneumococcal Vaccines (Age 50+) (1 of 1 - PCV) Never done    Influenza Vaccine (1) Never done    COVID-19 Vaccine (3 - 2024-25 season) 09/01/2024    Lipid Panel  11/09/2028    Colorectal Cancer Screening  03/27/2033    RSV Vaccine (Age 60+ and Pregnant patients) (1 - 1-dose 75+ series) 09/26/2042    Hepatitis C Screening  Completed           ROS        Objective   Vitals:    12/31/24 1000   BP: 134/78   BP Location: Left arm   Patient Position: Sitting   Pulse: 75   Resp: 18   Temp: 98.1 °F (36.7 °C)   TempSrc: Oral   SpO2: 96%   Weight: 96.6 kg (213 lb)   Height: 5' 4" (1.626 m)       Body mass index is 36.56 kg/m².       Physical Exam  Constitutional:       General: He is not in acute distress.     Appearance: He is obese. He is not ill-appearing.   HENT:      Head: Normocephalic and atraumatic.   Eyes:      Extraocular Movements: Extraocular movements intact.      Pupils: Pupils are equal, round, and reactive to light.   Cardiovascular:      Rate and Rhythm: Normal rate and regular rhythm.   Pulmonary:      Effort: Pulmonary effort is normal.      Breath sounds: Normal breath sounds.   Abdominal:      Palpations: Abdomen is soft.   Musculoskeletal:         General: Normal range of motion.      Cervical back: Normal range of motion.   Skin:     General: Skin is warm and dry.   Neurological:      General: No focal deficit present.      Mental Status: He is alert and oriented to person, place, and time. Mental status is at baseline. "   Psychiatric:         Mood and Affect: Mood normal.         Thought Content: Thought content normal.          Establishing care with new doctor, encounter for    Type 2 diabetes mellitus with hyperglycemia, without long-term current use of insulin  Assessment & Plan:  Chronic problem unsure of stability. Will assess with updated blood work including A1C.       Orders:  -     Comprehensive Metabolic Panel; Future; Expected date: 12/31/2024  -     Hemoglobin A1C; Future; Expected date: 12/31/2024  -     CBC Auto Differential; Future; Expected date: 12/31/2024    Mixed hyperlipidemia due to type 2 diabetes mellitus  Assessment & Plan:  Chronic problem unsure of stability. Continue current regimen including Atorvastatin.  Will assess with updated blood work including lipid panel.       Orders:  -     Lipid Panel; Future; Expected date: 12/31/2024  -     atorvastatin (LIPITOR) 40 MG tablet; Take 1 tablet (40 mg total) by mouth once daily.  Dispense: 90 tablet; Refill: 3    Psoriatic arthritis  Assessment & Plan:  Chronic problem stable per patient . Continue current regimen including Bimzelx and naproxen.       Orders:  -     naproxen (NAPROSYN) 500 MG tablet; Take 1 tablet (500 mg total) by mouth daily as needed (pain).  Dispense: 90 tablet; Refill: 3    Class 2 severe obesity due to excess calories with serious comorbidity and body mass index (BMI) of 36.0 to 36.9 in adult  Assessment & Plan:  Chronic problem stable per patient . Discussed importance of lifestyle modifications including diet and exercise. Handout given on Mediterranean diet        Encounter for screening for HIV  -     HIV 1/2 Ag/Ab (4th Gen); Future; Expected date: 12/31/2024              Justin Poole             Portions of this note were created using voice recognition software. There may be voice recognition errors found in the text, and attempts were made to correct these errors prior to signature.

## 2024-12-31 ENCOUNTER — OFFICE VISIT (OUTPATIENT)
Dept: FAMILY MEDICINE | Facility: CLINIC | Age: 59
End: 2024-12-31
Payer: COMMERCIAL

## 2024-12-31 VITALS
SYSTOLIC BLOOD PRESSURE: 134 MMHG | WEIGHT: 213 LBS | RESPIRATION RATE: 18 BRPM | TEMPERATURE: 98 F | HEART RATE: 75 BPM | DIASTOLIC BLOOD PRESSURE: 78 MMHG | OXYGEN SATURATION: 96 % | BODY MASS INDEX: 36.37 KG/M2 | HEIGHT: 64 IN

## 2024-12-31 DIAGNOSIS — Z76.89 ESTABLISHING CARE WITH NEW DOCTOR, ENCOUNTER FOR: Primary | ICD-10-CM

## 2024-12-31 DIAGNOSIS — E11.65 TYPE 2 DIABETES MELLITUS WITH HYPERGLYCEMIA, WITHOUT LONG-TERM CURRENT USE OF INSULIN: ICD-10-CM

## 2024-12-31 DIAGNOSIS — E11.69 MIXED HYPERLIPIDEMIA DUE TO TYPE 2 DIABETES MELLITUS: ICD-10-CM

## 2024-12-31 DIAGNOSIS — E66.812 CLASS 2 SEVERE OBESITY DUE TO EXCESS CALORIES WITH SERIOUS COMORBIDITY AND BODY MASS INDEX (BMI) OF 36.0 TO 36.9 IN ADULT: ICD-10-CM

## 2024-12-31 DIAGNOSIS — L40.50 PSORIATIC ARTHRITIS: ICD-10-CM

## 2024-12-31 DIAGNOSIS — E66.01 CLASS 2 SEVERE OBESITY DUE TO EXCESS CALORIES WITH SERIOUS COMORBIDITY AND BODY MASS INDEX (BMI) OF 36.0 TO 36.9 IN ADULT: ICD-10-CM

## 2024-12-31 DIAGNOSIS — E78.2 MIXED HYPERLIPIDEMIA DUE TO TYPE 2 DIABETES MELLITUS: ICD-10-CM

## 2024-12-31 DIAGNOSIS — Z11.4 ENCOUNTER FOR SCREENING FOR HIV: ICD-10-CM

## 2024-12-31 PROCEDURE — 1160F RVW MEDS BY RX/DR IN RCRD: CPT | Mod: CPTII,S$GLB,, | Performed by: STUDENT IN AN ORGANIZED HEALTH CARE EDUCATION/TRAINING PROGRAM

## 2024-12-31 PROCEDURE — 1159F MED LIST DOCD IN RCRD: CPT | Mod: CPTII,S$GLB,, | Performed by: STUDENT IN AN ORGANIZED HEALTH CARE EDUCATION/TRAINING PROGRAM

## 2024-12-31 PROCEDURE — 99204 OFFICE O/P NEW MOD 45 MIN: CPT | Mod: S$GLB,,, | Performed by: STUDENT IN AN ORGANIZED HEALTH CARE EDUCATION/TRAINING PROGRAM

## 2024-12-31 PROCEDURE — 3008F BODY MASS INDEX DOCD: CPT | Mod: CPTII,S$GLB,, | Performed by: STUDENT IN AN ORGANIZED HEALTH CARE EDUCATION/TRAINING PROGRAM

## 2024-12-31 PROCEDURE — 3078F DIAST BP <80 MM HG: CPT | Mod: CPTII,S$GLB,, | Performed by: STUDENT IN AN ORGANIZED HEALTH CARE EDUCATION/TRAINING PROGRAM

## 2024-12-31 PROCEDURE — 3075F SYST BP GE 130 - 139MM HG: CPT | Mod: CPTII,S$GLB,, | Performed by: STUDENT IN AN ORGANIZED HEALTH CARE EDUCATION/TRAINING PROGRAM

## 2024-12-31 PROCEDURE — 99999 PR PBB SHADOW E&M-EST. PATIENT-LVL IV: CPT | Mod: PBBFAC,,, | Performed by: STUDENT IN AN ORGANIZED HEALTH CARE EDUCATION/TRAINING PROGRAM

## 2024-12-31 RX ORDER — ATORVASTATIN CALCIUM 40 MG/1
40 TABLET, FILM COATED ORAL DAILY
Qty: 90 TABLET | Refills: 3 | Status: SHIPPED | OUTPATIENT
Start: 2024-12-31 | End: 2025-12-31

## 2024-12-31 RX ORDER — NAPROXEN 500 MG/1
500 TABLET ORAL DAILY PRN
Qty: 90 TABLET | Refills: 3 | Status: SHIPPED | OUTPATIENT
Start: 2024-12-31 | End: 2025-12-26

## 2024-12-31 RX ORDER — CLOBETASOL PROPIONATE 0.5 MG/G
OINTMENT TOPICAL
COMMUNITY
Start: 2024-02-28

## 2024-12-31 RX ORDER — BIMEKIZUMAB 160 MG/ML
INJECTION, SOLUTION SUBCUTANEOUS
COMMUNITY
Start: 2024-09-04

## 2024-12-31 NOTE — ASSESSMENT & PLAN NOTE
Chronic problem unsure of stability. Continue current regimen including Atorvastatin.  Will assess with updated blood work including lipid panel.

## 2024-12-31 NOTE — ASSESSMENT & PLAN NOTE
Chronic problem stable per patient . Discussed importance of lifestyle modifications including diet and exercise. Handout given on Mediterranean diet

## 2025-04-15 ENCOUNTER — TELEPHONE (OUTPATIENT)
Dept: FAMILY MEDICINE | Facility: CLINIC | Age: 60
End: 2025-04-15

## 2025-04-15 NOTE — TELEPHONE ENCOUNTER
----- Message from Mar sent at 4/15/2025  3:13 PM CDT -----  Pt called to schedule an appointment for labs.[phone]

## 2025-04-21 ENCOUNTER — LAB VISIT (OUTPATIENT)
Dept: LAB | Facility: HOSPITAL | Age: 60
End: 2025-04-21
Attending: STUDENT IN AN ORGANIZED HEALTH CARE EDUCATION/TRAINING PROGRAM
Payer: COMMERCIAL

## 2025-04-21 DIAGNOSIS — E11.65 TYPE 2 DIABETES MELLITUS WITH HYPERGLYCEMIA, WITHOUT LONG-TERM CURRENT USE OF INSULIN: ICD-10-CM

## 2025-04-21 DIAGNOSIS — E11.69 MIXED HYPERLIPIDEMIA DUE TO TYPE 2 DIABETES MELLITUS: ICD-10-CM

## 2025-04-21 DIAGNOSIS — Z11.4 ENCOUNTER FOR SCREENING FOR HIV: ICD-10-CM

## 2025-04-21 DIAGNOSIS — E78.2 MIXED HYPERLIPIDEMIA DUE TO TYPE 2 DIABETES MELLITUS: ICD-10-CM

## 2025-04-21 LAB
ABSOLUTE EOSINOPHIL (OHS): 0.49 K/UL
ABSOLUTE MONOCYTE (OHS): 0.48 K/UL (ref 0.3–1)
ABSOLUTE NEUTROPHIL COUNT (OHS): 4.01 K/UL (ref 1.8–7.7)
ALBUMIN SERPL BCP-MCNC: 3.4 G/DL (ref 3.5–5.2)
ALP SERPL-CCNC: 73 UNIT/L (ref 40–150)
ALT SERPL W/O P-5'-P-CCNC: 49 UNIT/L (ref 10–44)
ANION GAP (OHS): 12 MMOL/L (ref 8–16)
AST SERPL-CCNC: 41 UNIT/L (ref 11–45)
BASOPHILS # BLD AUTO: 0.06 K/UL
BASOPHILS NFR BLD AUTO: 0.9 %
BILIRUB SERPL-MCNC: 0.4 MG/DL (ref 0.1–1)
BUN SERPL-MCNC: 13 MG/DL (ref 6–20)
CALCIUM SERPL-MCNC: 8.9 MG/DL (ref 8.7–10.5)
CHLORIDE SERPL-SCNC: 109 MMOL/L (ref 95–110)
CHOLEST SERPL-MCNC: 178 MG/DL (ref 120–199)
CHOLEST/HDLC SERPL: 4.7 {RATIO} (ref 2–5)
CO2 SERPL-SCNC: 19 MMOL/L (ref 23–29)
CREAT SERPL-MCNC: 1 MG/DL (ref 0.5–1.4)
EAG (OHS): 148 MG/DL (ref 68–131)
ERYTHROCYTE [DISTWIDTH] IN BLOOD BY AUTOMATED COUNT: 13.2 % (ref 11.5–14.5)
GFR SERPLBLD CREATININE-BSD FMLA CKD-EPI: >60 ML/MIN/1.73/M2
GLUCOSE SERPL-MCNC: 156 MG/DL (ref 70–110)
HBA1C MFR BLD: 6.8 % (ref 4–5.6)
HCT VFR BLD AUTO: 47.7 % (ref 40–54)
HDLC SERPL-MCNC: 38 MG/DL (ref 40–75)
HDLC SERPL: 21.3 % (ref 20–50)
HGB BLD-MCNC: 15.4 GM/DL (ref 14–18)
HIV 1+2 AB+HIV1 P24 AG SERPL QL IA: NORMAL
IMM GRANULOCYTES # BLD AUTO: 0.02 K/UL (ref 0–0.04)
IMM GRANULOCYTES NFR BLD AUTO: 0.3 % (ref 0–0.5)
LDLC SERPL CALC-MCNC: 115 MG/DL (ref 63–159)
LYMPHOCYTES # BLD AUTO: 1.73 K/UL (ref 1–4.8)
MCH RBC QN AUTO: 30.1 PG (ref 27–31)
MCHC RBC AUTO-ENTMCNC: 32.3 G/DL (ref 32–36)
MCV RBC AUTO: 93 FL (ref 82–98)
NONHDLC SERPL-MCNC: 140 MG/DL
NUCLEATED RBC (/100WBC) (OHS): 0 /100 WBC
PLATELET # BLD AUTO: 212 K/UL (ref 150–450)
PMV BLD AUTO: 10.9 FL (ref 9.2–12.9)
POTASSIUM SERPL-SCNC: 4.7 MMOL/L (ref 3.5–5.1)
PROT SERPL-MCNC: 7.6 GM/DL (ref 6–8.4)
RBC # BLD AUTO: 5.11 M/UL (ref 4.6–6.2)
RELATIVE EOSINOPHIL (OHS): 7.2 %
RELATIVE LYMPHOCYTE (OHS): 25.5 % (ref 18–48)
RELATIVE MONOCYTE (OHS): 7.1 % (ref 4–15)
RELATIVE NEUTROPHIL (OHS): 59 % (ref 38–73)
SODIUM SERPL-SCNC: 140 MMOL/L (ref 136–145)
TRIGL SERPL-MCNC: 125 MG/DL (ref 30–150)
WBC # BLD AUTO: 6.79 K/UL (ref 3.9–12.7)

## 2025-04-21 PROCEDURE — 36415 COLL VENOUS BLD VENIPUNCTURE: CPT | Mod: PN

## 2025-04-21 PROCEDURE — 80061 LIPID PANEL: CPT

## 2025-04-21 PROCEDURE — 83036 HEMOGLOBIN GLYCOSYLATED A1C: CPT

## 2025-04-21 PROCEDURE — 80053 COMPREHEN METABOLIC PANEL: CPT

## 2025-04-21 PROCEDURE — 85025 COMPLETE CBC W/AUTO DIFF WBC: CPT

## 2025-04-21 PROCEDURE — 87389 HIV-1 AG W/HIV-1&-2 AB AG IA: CPT

## 2025-04-22 ENCOUNTER — RESULTS FOLLOW-UP (OUTPATIENT)
Dept: FAMILY MEDICINE | Facility: CLINIC | Age: 60
End: 2025-04-22

## 2025-04-28 ENCOUNTER — OFFICE VISIT (OUTPATIENT)
Dept: FAMILY MEDICINE | Facility: CLINIC | Age: 60
End: 2025-04-28
Payer: COMMERCIAL

## 2025-04-28 VITALS
SYSTOLIC BLOOD PRESSURE: 116 MMHG | HEIGHT: 64 IN | TEMPERATURE: 98 F | OXYGEN SATURATION: 98 % | HEART RATE: 68 BPM | WEIGHT: 208.75 LBS | DIASTOLIC BLOOD PRESSURE: 74 MMHG | BODY MASS INDEX: 35.64 KG/M2

## 2025-04-28 DIAGNOSIS — E11.9 ENCOUNTER FOR DIABETIC FOOT EXAM: ICD-10-CM

## 2025-04-28 DIAGNOSIS — E11.65 TYPE 2 DIABETES MELLITUS WITH HYPERGLYCEMIA, WITHOUT LONG-TERM CURRENT USE OF INSULIN: Primary | ICD-10-CM

## 2025-04-28 DIAGNOSIS — R74.01 ELEVATED ALT MEASUREMENT: ICD-10-CM

## 2025-04-28 DIAGNOSIS — K52.9 GASTROENTERITIS: ICD-10-CM

## 2025-04-28 DIAGNOSIS — B35.1 FUNGAL TOENAIL INFECTION: ICD-10-CM

## 2025-04-28 PROCEDURE — 3074F SYST BP LT 130 MM HG: CPT | Mod: CPTII,S$GLB,, | Performed by: STUDENT IN AN ORGANIZED HEALTH CARE EDUCATION/TRAINING PROGRAM

## 2025-04-28 PROCEDURE — 1159F MED LIST DOCD IN RCRD: CPT | Mod: CPTII,S$GLB,, | Performed by: STUDENT IN AN ORGANIZED HEALTH CARE EDUCATION/TRAINING PROGRAM

## 2025-04-28 PROCEDURE — G2211 COMPLEX E/M VISIT ADD ON: HCPCS | Mod: S$GLB,,, | Performed by: STUDENT IN AN ORGANIZED HEALTH CARE EDUCATION/TRAINING PROGRAM

## 2025-04-28 PROCEDURE — 3008F BODY MASS INDEX DOCD: CPT | Mod: CPTII,S$GLB,, | Performed by: STUDENT IN AN ORGANIZED HEALTH CARE EDUCATION/TRAINING PROGRAM

## 2025-04-28 PROCEDURE — 1160F RVW MEDS BY RX/DR IN RCRD: CPT | Mod: CPTII,S$GLB,, | Performed by: STUDENT IN AN ORGANIZED HEALTH CARE EDUCATION/TRAINING PROGRAM

## 2025-04-28 PROCEDURE — 99214 OFFICE O/P EST MOD 30 MIN: CPT | Mod: S$GLB,,, | Performed by: STUDENT IN AN ORGANIZED HEALTH CARE EDUCATION/TRAINING PROGRAM

## 2025-04-28 PROCEDURE — 3078F DIAST BP <80 MM HG: CPT | Mod: CPTII,S$GLB,, | Performed by: STUDENT IN AN ORGANIZED HEALTH CARE EDUCATION/TRAINING PROGRAM

## 2025-04-28 PROCEDURE — 3044F HG A1C LEVEL LT 7.0%: CPT | Mod: CPTII,S$GLB,, | Performed by: STUDENT IN AN ORGANIZED HEALTH CARE EDUCATION/TRAINING PROGRAM

## 2025-04-28 PROCEDURE — 99999 PR PBB SHADOW E&M-EST. PATIENT-LVL IV: CPT | Mod: PBBFAC,,, | Performed by: STUDENT IN AN ORGANIZED HEALTH CARE EDUCATION/TRAINING PROGRAM

## 2025-04-28 RX ORDER — TERBINAFINE HYDROCHLORIDE 250 MG/1
250 TABLET ORAL DAILY
Qty: 90 TABLET | Refills: 0 | Status: SHIPPED | OUTPATIENT
Start: 2025-04-28 | End: 2025-07-27

## 2025-04-28 NOTE — ASSESSMENT & PLAN NOTE
Chronic problem.  Stable based on last A1c.  He may benefit from Ozempic given potential concurrent fatty liver.

## 2025-04-28 NOTE — PROGRESS NOTES
"Subjective      Guanakito Orosco is a 59 y.o. male        Chief Complaint   Patient presents with    Diabetes    Results     labs        HPI     Patient presents today for Chronic Care Management follow up.     Patient also with concerns of recent GI bug.  He has recently had both vomiting and diarrhea.  He reports the vomiting has improved.  He still reports some mild diarrhea without noticeable abdominal pain.  He has taken over-the-counter Gas-X to help with his bloating.        Problem Noted   Fungal Toenail Infection 4/28/2025   Elevated Alt Measurement 4/28/2025    Patient with  elevated ALT, which was noted on previous lab work.  Last liver function listed below.  Patient has been told that he has fatty liver in the past on ultrasound.      Lab Results   Component Value Date    ALT 49 (H) 04/21/2025    AST 41 04/21/2025    ALKPHOS 73 04/21/2025    BILITOT 0.4 04/21/2025          Mixed Hyperlipidemia Due to Type 2 Diabetes Mellitus 12/31/2024    Patient with history of hyperlipidemia. The patient reports compliance with Atorvastatin. Last lipid panel listed below.     Lab Results   Component Value Date    CHOL 178 11/09/2023     Lab Results   Component Value Date    HDL 40 11/09/2023     Lab Results   Component Value Date    LDLCALC 107 (H) 11/09/2023     Lab Results   Component Value Date    TRIG 175 (H) 11/09/2023       No results found for: "CHOLHDL"        Chronic Right-Sided Low Back Pain Without Sciatica 4/8/2024   Type 2 Diabetes Mellitus With Hyperglycemia, Without Long-Term Current Use of Insulin 10/27/2022    Patient with history of type 2 diabetes.  Last A1c listed below.  He is not currently on any medications for this concern.  He has performed lifestyle modifications in the form of diet and exercise.    Lab Results   Component Value Date    HGBA1C 6.8 (H) 04/21/2025          Gastroesophageal Reflux Disease 10/27/2022    Patient with history of GERD.  He has previously been on famotidine for " this concern.  He however reports that his medication is no longer helping with this concern.  He would like to restart his omeprazole, which he took on an as-needed basis.     Nstemi (Non-St Elevated Myocardial Infarction) 10/17/2022    Patient with a history of NSTEMI.  This was noted on recent hospitalization on 10/17/2022.  Patient was assessed by cardiology during that visit with recommendations for medication management including aspirin, Brilinta, atorvastatin, and metoprolol.     Since last visit, patient has followed up with Cardiology Dr. Quintanilla, whom has recommended stopping metoprolol and Brilinta.  He has a wanted you with both aspirin and atorvastatin.     Class 2 Severe Obesity Due to Excess Calories With Serious Comorbidity and Body Mass Index (Bmi) of 35.0 to 35.9 in Adult 10/16/2022    Patient with history of elevated BMI. The patient denies regular exercise and dietary modifications. Last recorded BMI listed below.        Body mass index is 36.56 kg/m².       Psoriatic Arthritis 8/26/2014    Patient with history of psoriatic arthritis.  He is currently followed by dermatology for this concern.  He reports well-controlled on Bimzelx.  Patient also has as needed naproxen with inconsistent use.                  ALLERGIES  Allopurinol, Codeine, and Penicillins     MEDICATIONS  Encounter Medications[1]     PAST MEDICAL HISTORY  Past Medical History:   Diagnosis Date    Acid reflux     Obesity     Psoriasis     severe    Psoriatic arthritis     Urolithiasis         PAST SURGIAL HISTORY  Past Surgical History:   Procedure Laterality Date    ANGIOGRAM, CORONARY, WITH LEFT HEART CATHETERIZATION N/A 10/17/2022    Procedure: Angiogram, Coronary, with Left Heart Cath;  Surgeon: Rivera Curtis MD;  Location: [facility] CATH[facility]EP LAB;  Service: Cardiology;  Laterality: N/A;    CIRCUMCISION      EXTRACORPOREAL SHOCK WAVE LITHOTRIPSY  2006    tristan redmond md    holmium lithotripsy L ureteral stone  8/2014     [facility] - [facility]    ureteroscopic removal of stones  2006    tristan redmond md        FAMILY HISTORY  No family history on file.     PAST SOCIAL HISTORY  Social History     Socioeconomic History    Marital status:    Tobacco Use    Smoking status: Never    Smokeless tobacco: Never   Substance and Sexual Activity    Alcohol use: No    Drug use: No    Sexual activity: Yes     Partners: Female     Social Drivers of Health     Financial Resource Strain: Low Risk  (12/30/2024)    Overall Financial Resource Strain (CARDIA)     Difficulty of Paying Living Expenses: Not hard at all   Food Insecurity: No Food Insecurity (12/30/2024)    Hunger Vital Sign     Worried About Running Out of Food in the Last Year: Never true     Ran Out of Food in the Last Year: Never true   Physical Activity: Insufficiently Active (12/30/2024)    Exercise Vital Sign     Days of Exercise per Week: 2 days     Minutes of Exercise per Session: 30 min   Stress: No Stress Concern Present (12/30/2024)    [employer] [place] - Occupational Stress Questionnaire     Feeling of Stress : Not at all   Housing Stability: Unknown (12/30/2024)    Housing Stability Vital Sign     Unable to Pay for Housing in the Last Year: No        Health Maintenance   Topic Date Due    Foot Exam  Never done    Diabetic Eye Exam  Never done    TETANUS VACCINE  Never done    Pneumococcal Vaccines (Age 50+) (1 of 2 - PCV) Never done    Shingles Vaccine (1 of 2) Never done    Influenza Vaccine (1) Never done    COVID-19 Vaccine (3 - 2024-25 season) 09/01/2024    Diabetes Urine Screening  11/09/2024    Hemoglobin A1c  10/21/2025    Lipid Panel  04/21/2026    High Dose Statin  04/28/2026    Colorectal Cancer Screening  03/27/2033    RSV Vaccine (Age 60+ and Pregnant patients) (1 - 1-dose 75+ series) 09/26/2042    Hepatitis C Screening  Completed    HIV Screening  Completed           ROS        Objective   Vitals:    04/28/25 0903   BP: 116/74   BP  "Location: Right arm   Patient Position: Sitting   Pulse: 68   Temp: 97.9 °F (36.6 °C)   TempSrc: Oral   SpO2: 98%   Weight: 94.7 kg (208 lb 12.4 oz)   Height: 5' 4" (1.626 m)       Body mass index is 35.84 kg/m².       Physical Exam  Constitutional:       General: He is not in acute distress.     Appearance: He is obese. He is not ill-appearing.   HENT:      Head: Normocephalic and atraumatic.   Eyes:      Extraocular Movements: Extraocular movements intact.      Pupils: Pupils are equal, round, and reactive to light.   Cardiovascular:      Rate and Rhythm: Normal rate and regular rhythm.   Pulmonary:      Effort: Pulmonary effort is normal.      Breath sounds: Normal breath sounds.   Abdominal:      Palpations: Abdomen is soft.   Musculoskeletal:         General: Normal range of motion.      Cervical back: Normal range of motion.   Skin:     General: Skin is warm and dry.   Neurological:      General: No focal deficit present.      Mental Status: He is alert and oriented to person, place, and time. Mental status is at baseline.   Psychiatric:         Mood and Affect: Mood normal.         Thought Content: Thought content normal.       Protective Sensation (w/ 10 gram monofilament):  Right: Intact  Left: Intact    Visual Inspection:  Onychomycosis -  Bilateral    Pedal Pulses:   Right: Present  Left: Present    Posterior Tibialis Pulses:   Right:Present  Left: Present       Type 2 diabetes mellitus with hyperglycemia, without long-term current use of insulin  Assessment & Plan:  Chronic problem.  Stable based on last A1c.  He may benefit from Ozempic given potential concurrent fatty liver.      Elevated ALT measurement  Assessment & Plan:  Chronic problem.  Unsure if stability.  We will assess with abdominal ultrasound.  May benefit from Ozempic given concurrent diabetes    Orders:  -     US Abdomen Limited; Future; Expected date: 04/28/2025    Fungal toenail infection  Assessment & Plan:  Chronic problem with " progression.  Not well controlled per patient.  Start Lamisil.    Orders:  -     terbinafine HCL (LAMISIL) 250 mg tablet; Take 1 tablet (250 mg total) by mouth once daily.  Dispense: 90 tablet; Refill: 0    Gastroenteritis  Comments:  Acute problem.  Improving per patient.  Start probiotic.  Continue bland diet with avoidance of dairy    Encounter for diabetic foot exam  Comments:  Performed today.  10/10 sites bilaterally with onychomycosis            Justin Marileewali             Portions of this note were created using voice recognition software. There may be voice recognition errors found in the text, and attempts were made to correct these errors prior to signature.        [1]   Outpatient Encounter Medications as of 4/28/2025   Medication Sig Dispense Refill    aspirin (ECOTRIN) 81 MG EC tablet Take 1 tablet (81 mg total) by mouth once daily. 90 tablet 3    atorvastatin (LIPITOR) 40 MG tablet Take 1 tablet (40 mg total) by mouth once daily. 90 tablet 3    bimekizumab-bkzx (BIMZELX AUTOINJECTOR) 160 mg/mL AtIn Every  other month      clobetasol 0.05% (TEMOVATE) 0.05 % Oint 1 efren applied topically to affected areas on body 2 times a day only PRN flare ups for 14 days      famotidine (PEPCID) 20 MG tablet Take 1 tablet (20 mg total) by mouth 2 (two) times daily. 60 tablet 11    naproxen (NAPROSYN) 500 MG tablet Take 1 tablet (500 mg total) by mouth daily as needed (pain). 90 tablet 3    terbinafine HCL (LAMISIL) 250 mg tablet Take 1 tablet (250 mg total) by mouth once daily. 90 tablet 0     No facility-administered encounter medications on file as of 4/28/2025.

## 2025-04-28 NOTE — ASSESSMENT & PLAN NOTE
Chronic problem.  Unsure if stability.  We will assess with abdominal ultrasound.  May benefit from Ozempic given concurrent diabetes

## 2025-05-05 ENCOUNTER — HOSPITAL ENCOUNTER (OUTPATIENT)
Dept: RADIOLOGY | Facility: HOSPITAL | Age: 60
Discharge: HOME | End: 2025-05-05
Attending: STUDENT IN AN ORGANIZED HEALTH CARE EDUCATION/TRAINING PROGRAM
Payer: COMMERCIAL

## 2025-05-05 ENCOUNTER — RESULTS FOLLOW-UP (OUTPATIENT)
Dept: FAMILY MEDICINE | Facility: CLINIC | Age: 60
End: 2025-05-05

## 2025-05-05 DIAGNOSIS — R74.01 ELEVATED ALT MEASUREMENT: ICD-10-CM

## 2025-05-05 DIAGNOSIS — R16.0 LIVER MASS: Primary | ICD-10-CM

## 2025-05-05 PROCEDURE — 76705 ECHO EXAM OF ABDOMEN: CPT | Mod: 26,,, | Performed by: RADIOLOGY

## 2025-05-05 PROCEDURE — 76705 ECHO EXAM OF ABDOMEN: CPT | Mod: TC,PO

## 2025-05-08 DIAGNOSIS — E11.9 TYPE 2 DIABETES MELLITUS WITHOUT COMPLICATION: ICD-10-CM

## 2025-05-09 ENCOUNTER — RESULTS FOLLOW-UP (OUTPATIENT)
Dept: FAMILY MEDICINE | Facility: CLINIC | Age: 60
End: 2025-05-09

## 2025-05-09 ENCOUNTER — HOSPITAL ENCOUNTER (OUTPATIENT)
Dept: RADIOLOGY | Facility: HOSPITAL | Age: 60
Discharge: HOME | End: 2025-05-09
Attending: STUDENT IN AN ORGANIZED HEALTH CARE EDUCATION/TRAINING PROGRAM
Payer: COMMERCIAL

## 2025-05-09 DIAGNOSIS — R16.0 LIVER MASS: ICD-10-CM

## 2025-05-09 PROCEDURE — 74183 MRI ABD W/O CNTR FLWD CNTR: CPT | Mod: 26,,, | Performed by: RADIOLOGY

## 2025-05-09 PROCEDURE — A9585 GADOBUTROL INJECTION: HCPCS | Mod: PO | Performed by: STUDENT IN AN ORGANIZED HEALTH CARE EDUCATION/TRAINING PROGRAM

## 2025-05-09 PROCEDURE — 74183 MRI ABD W/O CNTR FLWD CNTR: CPT | Mod: TC,PO

## 2025-05-09 PROCEDURE — 25500020 PHARM REV CODE 255: Mod: PO | Performed by: STUDENT IN AN ORGANIZED HEALTH CARE EDUCATION/TRAINING PROGRAM

## 2025-05-09 RX ORDER — GADOBUTROL 604.72 MG/ML
9.5 INJECTION INTRAVENOUS
Status: COMPLETED | OUTPATIENT
Start: 2025-05-09 | End: 2025-05-09

## 2025-05-09 RX ADMIN — GADOBUTROL 9.5 ML: 604.72 INJECTION INTRAVENOUS at 11:05

## 2025-05-23 ENCOUNTER — PATIENT MESSAGE (OUTPATIENT)
Dept: ADMINISTRATIVE | Facility: HOSPITAL | Age: 60
End: 2025-05-23
Payer: COMMERCIAL

## 2025-05-29 ENCOUNTER — PATIENT MESSAGE (OUTPATIENT)
Dept: ADMINISTRATIVE | Facility: HOSPITAL | Age: 60
End: 2025-05-29
Payer: COMMERCIAL

## (undated) DEVICE — SHEATH PINNACLE 5FRX10CM W/GUIDEWIRE

## (undated) DEVICE — CATHETER DIAGNOSTIC DXTERITY 6FR JL 4

## (undated) DEVICE — Device

## (undated) DEVICE — SHEATH PINNACLE 6FRX10CM W/GUIDEWIRE

## (undated) DEVICE — CATHETER DIAGNOSTIC DXTERITY 6FR NTR

## (undated) DEVICE — GUIDEWIRE DOUBLE ENDED .035 DIA. 150CML